# Patient Record
Sex: MALE | Race: OTHER | NOT HISPANIC OR LATINO | Employment: PART TIME | URBAN - METROPOLITAN AREA
[De-identification: names, ages, dates, MRNs, and addresses within clinical notes are randomized per-mention and may not be internally consistent; named-entity substitution may affect disease eponyms.]

---

## 2017-11-03 ENCOUNTER — ALLSCRIPTS OFFICE VISIT (OUTPATIENT)
Dept: OTHER | Facility: OTHER | Age: 62
End: 2017-11-03

## 2017-11-03 DIAGNOSIS — C61 MALIGNANT NEOPLASM OF PROSTATE (HCC): ICD-10-CM

## 2017-11-03 LAB
BILIRUB UR QL STRIP: NORMAL
CLARITY UR: NORMAL
COLOR UR: YELLOW
GLUCOSE (HISTORICAL): NORMAL
HGB UR QL STRIP.AUTO: NORMAL
KETONES UR STRIP-MCNC: NORMAL MG/DL
LEUKOCYTE ESTERASE UR QL STRIP: NORMAL
NITRITE UR QL STRIP: NORMAL
PH UR STRIP.AUTO: 6.5 [PH]
PROT UR STRIP-MCNC: NORMAL MG/DL
SP GR UR STRIP.AUTO: 1.01
UROBILINOGEN UR QL STRIP.AUTO: 0.2

## 2017-11-08 ENCOUNTER — TRANSCRIBE ORDERS (OUTPATIENT)
Dept: ADMINISTRATIVE | Facility: HOSPITAL | Age: 62
End: 2017-11-08

## 2017-11-08 ENCOUNTER — APPOINTMENT (OUTPATIENT)
Dept: LAB | Facility: HOSPITAL | Age: 62
End: 2017-11-08
Attending: UROLOGY
Payer: COMMERCIAL

## 2017-11-08 DIAGNOSIS — C61 MALIGNANT NEOPLASM OF PROSTATE (HCC): ICD-10-CM

## 2017-11-08 LAB
PSA SERPL-MCNC: 0.2 NG/ML (ref 0–4)
TESTOST SERPL-MCNC: 373.7 NG/DL (ref 241–827)

## 2017-11-08 PROCEDURE — 36415 COLL VENOUS BLD VENIPUNCTURE: CPT

## 2017-11-08 PROCEDURE — 84403 ASSAY OF TOTAL TESTOSTERONE: CPT

## 2017-11-08 PROCEDURE — 84153 ASSAY OF PSA TOTAL: CPT

## 2018-01-22 VITALS
WEIGHT: 164 LBS | SYSTOLIC BLOOD PRESSURE: 110 MMHG | BODY MASS INDEX: 25.74 KG/M2 | DIASTOLIC BLOOD PRESSURE: 84 MMHG | HEIGHT: 67 IN

## 2018-05-03 DIAGNOSIS — C61 MALIGNANT NEOPLASM OF PROSTATE (HCC): ICD-10-CM

## 2018-05-07 ENCOUNTER — TRANSCRIBE ORDERS (OUTPATIENT)
Dept: ADMINISTRATIVE | Facility: HOSPITAL | Age: 63
End: 2018-05-07

## 2018-05-07 ENCOUNTER — APPOINTMENT (OUTPATIENT)
Dept: LAB | Facility: HOSPITAL | Age: 63
End: 2018-05-07
Attending: UROLOGY
Payer: COMMERCIAL

## 2018-05-07 DIAGNOSIS — C61 MALIGNANT NEOPLASM OF PROSTATE (HCC): ICD-10-CM

## 2018-05-07 LAB
PSA SERPL-MCNC: 0.5 NG/ML (ref 0–4)
TESTOST SERPL-MCNC: 404 NG/DL (ref 95–948)

## 2018-05-07 PROCEDURE — 36415 COLL VENOUS BLD VENIPUNCTURE: CPT

## 2018-05-07 PROCEDURE — 84403 ASSAY OF TOTAL TESTOSTERONE: CPT

## 2018-05-07 PROCEDURE — 84153 ASSAY OF PSA TOTAL: CPT

## 2018-05-21 RX ORDER — SIMVASTATIN 80 MG
TABLET ORAL
COMMUNITY

## 2018-05-23 ENCOUNTER — OFFICE VISIT (OUTPATIENT)
Dept: UROLOGY | Facility: MEDICAL CENTER | Age: 63
End: 2018-05-23
Payer: COMMERCIAL

## 2018-05-23 VITALS
DIASTOLIC BLOOD PRESSURE: 84 MMHG | HEIGHT: 67 IN | WEIGHT: 163 LBS | SYSTOLIC BLOOD PRESSURE: 128 MMHG | BODY MASS INDEX: 25.58 KG/M2

## 2018-05-23 DIAGNOSIS — C61 MALIGNANT NEOPLASM OF PROSTATE (HCC): Primary | ICD-10-CM

## 2018-05-23 PROBLEM — E78.00 HYPERCHOLESTEROLEMIA: Status: ACTIVE | Noted: 2017-10-31

## 2018-05-23 LAB
SL AMB  POCT GLUCOSE, UA: NORMAL
SL AMB LEUKOCYTE ESTERASE,UA: NORMAL
SL AMB POCT BILIRUBIN,UA: NORMAL
SL AMB POCT BLOOD,UA: NORMAL
SL AMB POCT CLARITY,UA: CLEAR
SL AMB POCT COLOR,UA: YELLOW
SL AMB POCT KETONES,UA: NORMAL
SL AMB POCT NITRITE,UA: NORMAL
SL AMB POCT PH,UA: 7
SL AMB POCT SPECIFIC GRAVITY,UA: 1.01
SL AMB POCT URINE PROTEIN: NORMAL
SL AMB POCT UROBILINOGEN: 0.2

## 2018-05-23 PROCEDURE — 81003 URINALYSIS AUTO W/O SCOPE: CPT | Performed by: UROLOGY

## 2018-05-23 PROCEDURE — 99214 OFFICE O/P EST MOD 30 MIN: CPT | Performed by: UROLOGY

## 2018-05-23 NOTE — PROGRESS NOTES
IPSS Questionnaire (AUA-7): Over the past month    1)  How often have you had a sensation of not emptying your bladder completely after you finish urinating? 0 - Not at all   2)  How often have you had to urinate again less than two hours after you finished urinating? 1 - Less than 1 time in 5   3)  How often have you found you stopped and started again several times when you urinated? 0 - Not at all   4) How difficult have you found it to postpone urination? 1 - Less than 1 time in 5   5) How often have you had a weak urinary stream?  0 - Not at all   6) How often have you had to push or strain to begin urination? 0 - Not at all   7) How many times did you most typically get up to urinate from the time you went to bed until the time you got up in the morning?   1 - 1 time   Total Score:  3

## 2018-05-23 NOTE — ASSESSMENT & PLAN NOTE
PSA is 0 5 (May 7, 2018)  Testosterone level is now normal   He is voiding well and is pleased with his voiding pattern  Urinalysis is negative  We will continue to follow PSA  He will return in 6 months and repeat his PSA at that time

## 2018-05-23 NOTE — LETTER
May 23, 2018     Reymundo Reddy DO  [address] [facility]    Patient: Arthur Wells   YOB: 1955   Date of Visit: 5/23/2018       Dear Dr Chi Ruggiero: Thank you for referring Danny Roberts to me for evaluation  Below are my notes for this consultation  If you have questions, please do not hesitate to call me  I look forward to following your patient along with you  Sincerely,        Esteban Hurst MD        CC: No Recipients  Esteban Hurst MD  5/23/2018 11:21 AM  Sign at close encounter  Assessment/Plan:    No problem-specific Assessment & Plan notes found for this encounter  Diagnoses and all orders for this visit:    Malignant neoplasm of prostate ([facility] [address] )  -     POCT urine dip auto non-scope    Other orders  -     aspirin 81 MG tablet; Take by mouth  -     simvastatin (ZOCOR) 80 mg tablet; Take by mouth          Subjective:      Patient ID: Arthur Wells is a 65 y o  male      HPI    The following portions of the patient's history were reviewed and updated as appropriate: allergies, current medications, past family history, past medical history, past social history, past surgical history and problem list     Review of Systems      Objective:      /84 (BP Location: Left arm, Patient Position: Sitting)   Ht 5' 7" (1 702 m)   Wt 73 9 kg (163 lb)   BMI 25 53 kg/m²           Physical Exam

## 2018-05-23 NOTE — PROGRESS NOTES
Assessment/Plan:    Malignant neoplasm of prostate ([facility])  PSA is 0 5 (May 7, 2018)  Testosterone level is now normal   He is voiding well and is pleased with his voiding pattern  Urinalysis is negative  We will continue to follow PSA  He will return in 6 months and repeat his PSA at that time  Diagnoses and all orders for this visit:    Malignant neoplasm of prostate ([facility] [address] )  -     POCT urine dip auto non-scope  -     PSA Total, Diagnostic; Future    Other orders  -     aspirin 81 MG tablet; Take by mouth  -     simvastatin (ZOCOR) 80 mg tablet; Take by mouth          Subjective:      Patient ID: Radha Cheung is a 65 y o  male  58-year-old male who completed IMRT 9 months ago  He had 1- 6 month dose of neoadjuvant hormonal therapy  He discontinued the medication as he did not feel well on Lupron  He notes he is voiding very well  He has no new back or bone pain  There are no constitutional symptoms  His stream is good and he empties his bladder adequately  There is no gross hematuria, dysuria or symptoms of infection  He does not get up at night to urinate  He is pleased with his voiding pattern  He denies rectal bleeding  The following portions of the patient's history were reviewed and updated as appropriate: allergies, current medications, past family history, past medical history, past social history, past surgical history and problem list     Review of Systems   Constitutional: Negative for chills, diaphoresis, fatigue and fever  HENT: Negative  Eyes: Negative  Respiratory: Negative  Cardiovascular: Negative  Endocrine: Negative  Musculoskeletal: Negative  Skin: Positive for rash  Allergic/Immunologic: Negative  Neurological: Negative  Hematological: Negative  Psychiatric/Behavioral: Negative            Objective:      /84 (BP Location: Left arm, Patient Position: Sitting)   Ht 5' 7" (1 702 m)   Wt 73 9 kg (163 lb)   BMI 25 53 kg/m² Physical Exam   Constitutional: He is oriented to person, place, and time  He appears well-developed and well-nourished  HENT:   Head: Normocephalic and atraumatic  Eyes: Conjunctivae are normal    Neck: Neck supple  Cardiovascular: Normal rate  Pulmonary/Chest: Effort normal    Abdominal: Soft  He exhibits no distension and no mass  There is no tenderness  There is no rebound and no guarding  No inguinal hernia   Genitourinary: Rectum normal and penis normal  No penile tenderness  Genitourinary Comments: Tinea cruris    Prostate flat and free of induration   Musculoskeletal: Normal range of motion  Neurological: He is alert and oriented to person, place, and time  Skin: Skin is warm and dry  Psychiatric: He has a normal mood and affect  His behavior is normal  Judgment and thought content normal    Nursing note and vitals reviewed

## 2018-05-23 NOTE — PATIENT INSTRUCTIONS
Prostate Cancer, Ambulatory Care   GENERAL INFORMATION:   Prostate cancer, Ambulatory Care develops in the male sex gland that helps make semen (prostate)  It is about the size of a walnut and wraps around the urethra  The urethra is the tube that carries urine from the bladder to the end of the penis  In most cases, prostate cancer is slow growing  Common symptoms include the following:   · Trouble starting or stopping the flow of urine    · Feeling the need to urinate often, especially at night    · Pain or a burning feeling when you urinate or ejaculate semen    · Trouble having an erection    · Blood in your urine or semen    · Not being able to urinate at all    · Pain or stiffness in your lower back, hips, or upper thighs  Seek immediate care for the following symptoms:   · Chest pain when you take a deep breath or cough    · Coughing up blood    · Suddenly feeling lightheaded and short of breath    · Your leg feels warm, tender, and painful  It may look swollen and red  Treatment for prostate cancer: If you have early stage cancer, your healthcare provider may recommend that you have frequent tests and regular follow-up visits to watch for changes  You may also need any of the following:  · Hormone therapy  is medicine used to decrease testosterone (male hormone) levels  · Radiation therapy  is used to kill cancer cells with high-energy x-rays beams  You may receive radiation therapy from outside your body or from small beads or rods placed inside your prostate  · Surgery  may be needed, depending on the stage of the cancer  Part or all of your prostate may be removed  You may also need to have some lymph nodes taken out  This may help keep the cancer from spreading to other parts of your body  Manage your prostate cancer:   · Eat a variety of healthy foods  Healthy foods include fruits, vegetables, whole-grain breads, low-fat dairy products, beans, lean meats, and fish   Your healthcare provider may also recommend changes to the amounts of calcium and vitamin D you have each day  · Manage your weight  Obesity may increase your risk for problems from prostate cancer  Limit or do not have high-calorie foods or drinks  · Exercise as directed  Exercise may help you recover after treatment and may help prevent your prostate cancer from returning  Exercise can also help you manage your weight  Try to get at least 30 minutes of exercise 5 days a week, such as walking  · Do not smoke  If you smoke, it is never too late to quit  Smoking increases the risk that your prostate cancer will return after treatment  Smoking also increases your risk for other types of cancer  Ask your healthcare provider for information if you need help quitting  · Limit or do not drink alcohol  If you drink, limit alcohol to 2 drinks per day  A drink is 12 ounces of beer, 1½ ounces of liquor, or 5 ounces of wine  Follow up with your urologist or oncologist as directed:  Write down your questions so you remember to ask them during your visits  CARE AGREEMENT:   You have the right to help plan your care  Learn about your health condition and how it may be treated  Discuss treatment options with your caregivers to decide what care you want to receive  You always have the right to refuse treatment  The above information is an  only  It is not intended as medical advice for individual conditions or treatments  Talk to your doctor, nurse or pharmacist before following any medical regimen to see if it is safe and effective for you  © 2014 [address] is for End User's use only and may not be sold, redistributed or otherwise used for commercial purposes  All illustrations and images included in CareNotes® are the copyrighted property of [employer] M [employer]  or [employer]

## 2018-11-21 ENCOUNTER — APPOINTMENT (OUTPATIENT)
Dept: LAB | Facility: HOSPITAL | Age: 63
End: 2018-11-21
Attending: UROLOGY
Payer: COMMERCIAL

## 2018-11-21 DIAGNOSIS — C61 MALIGNANT NEOPLASM OF PROSTATE (HCC): ICD-10-CM

## 2018-11-21 LAB — PSA SERPL-MCNC: 0.6 NG/ML (ref 0–4)

## 2018-11-21 PROCEDURE — 84153 ASSAY OF PSA TOTAL: CPT

## 2018-11-21 PROCEDURE — 36415 COLL VENOUS BLD VENIPUNCTURE: CPT

## 2018-12-14 ENCOUNTER — OFFICE VISIT (OUTPATIENT)
Dept: UROLOGY | Facility: MEDICAL CENTER | Age: 63
End: 2018-12-14
Payer: COMMERCIAL

## 2018-12-14 VITALS
SYSTOLIC BLOOD PRESSURE: 138 MMHG | HEIGHT: 67 IN | WEIGHT: 156 LBS | DIASTOLIC BLOOD PRESSURE: 78 MMHG | BODY MASS INDEX: 24.48 KG/M2 | HEART RATE: 66 BPM

## 2018-12-14 DIAGNOSIS — C61 MALIGNANT NEOPLASM OF PROSTATE (HCC): Primary | ICD-10-CM

## 2018-12-14 LAB
SL AMB  POCT GLUCOSE, UA: NORMAL
SL AMB LEUKOCYTE ESTERASE,UA: NORMAL
SL AMB POCT BILIRUBIN,UA: NORMAL
SL AMB POCT BLOOD,UA: NORMAL
SL AMB POCT CLARITY,UA: CLEAR
SL AMB POCT COLOR,UA: YELLOW
SL AMB POCT KETONES,UA: NORMAL
SL AMB POCT NITRITE,UA: NORMAL
SL AMB POCT PH,UA: 7.5
SL AMB POCT SPECIFIC GRAVITY,UA: 1.01
SL AMB POCT URINE PROTEIN: NORMAL
SL AMB POCT UROBILINOGEN: 1

## 2018-12-14 PROCEDURE — 81003 URINALYSIS AUTO W/O SCOPE: CPT | Performed by: UROLOGY

## 2018-12-14 PROCEDURE — 99214 OFFICE O/P EST MOD 30 MIN: CPT | Performed by: UROLOGY

## 2018-12-14 RX ORDER — AZELASTINE 1 MG/ML
SPRAY, METERED NASAL
COMMUNITY
Start: 2018-12-11 | End: 2021-01-28 | Stop reason: CLARIF

## 2018-12-14 NOTE — PROGRESS NOTES
Assessment/Plan:    Malignant neoplasm of prostate [facility])  He completed radiation therapy August 2017  He received 1 dose of Depot Lupron prior to radiation therapy  AUA symptom score is 3  He is pleased with his voiding pattern  Urinalysis is negative  PSA was 0 6 on November 23, 2018  We will plan to recheck the PSA in 6 months  He will return in 6 months for follow-up  Diagnoses and all orders for this visit:    Malignant neoplasm of prostate ([facility] [address] )  -     POCT urine dip auto non-scope  -     PSA Total, Diagnostic; Future    Other orders  -     azelastine (ASTELIN) 0 1 % nasal spray;           Subjective:      Patient ID: Hanna Amor is a 59 y o  male  Chief complaint:  Prostate cancer    HPI:  72-year-old male followed for prostate cancer  He completed radiation therapy in August 2017  He received 1 6 months dose of neoadjuvant Lupron prior to beginning the radiation therapy  He notes he is voiding well  His stream is good and he empties his bladder adequately  There is no gross hematuria, dysuria or symptoms of infection  He has a good appetite  He remains active  There is no new back or bone pain  He is feeling well  The following portions of the patient's history were reviewed and updated as appropriate: allergies, current medications, past family history, past medical history, past social history, past surgical history and problem list     Review of Systems   Constitutional: Negative for chills, diaphoresis, fatigue and fever  HENT: Negative  Eyes: Negative  Respiratory: Negative  Cardiovascular: Negative  Gastrointestinal: Positive for anal bleeding  GI appt next week   Endocrine: Negative  Genitourinary:        See HPI   Musculoskeletal: Negative  Skin: Negative  Allergic/Immunologic: Negative  Neurological: Negative  Hematological: Negative  Psychiatric/Behavioral: Negative            Objective:      /78 (BP Location: Left arm, Patient Position: Sitting, Cuff Size: Adult)   Pulse 66   Ht 5' 7" (1 702 m)   Wt 70 8 kg (156 lb)   BMI 24 43 kg/m²          Physical Exam   Constitutional: He is oriented to person, place, and time  He appears well-developed and well-nourished  HENT:   Head: Normocephalic and atraumatic  Eyes: Conjunctivae are normal    Neck: Neck supple  Cardiovascular: Normal rate  Pulmonary/Chest: Effort normal    Abdominal: Soft  He exhibits no distension and no mass  There is no tenderness  There is no rebound and no guarding  No inguinal hernia   Genitourinary: Rectum normal and penis normal  No penile tenderness  Genitourinary Comments: Prostate flat and free of induration   Musculoskeletal: Normal range of motion  Neurological: He is alert and oriented to person, place, and time  Skin: Skin is warm and dry  Psychiatric: He has a normal mood and affect  His behavior is normal  Judgment and thought content normal    Vitals reviewed

## 2018-12-14 NOTE — PATIENT INSTRUCTIONS
Prostate Cancer, Ambulatory Care   GENERAL INFORMATION:   Prostate cancer, Ambulatory Care develops in the male sex gland that helps make semen (prostate)  It is about the size of a walnut and wraps around the urethra  The urethra is the tube that carries urine from the bladder to the end of the penis  In most cases, prostate cancer is slow growing  Common symptoms include the following:   · Trouble starting or stopping the flow of urine    · Feeling the need to urinate often, especially at night    · Pain or a burning feeling when you urinate or ejaculate semen    · Trouble having an erection    · Blood in your urine or semen    · Not being able to urinate at all    · Pain or stiffness in your lower back, hips, or upper thighs  Seek immediate care for the following symptoms:   · Chest pain when you take a deep breath or cough    · Coughing up blood    · Suddenly feeling lightheaded and short of breath    · Your leg feels warm, tender, and painful  It may look swollen and red  Treatment for prostate cancer: If you have early stage cancer, your healthcare provider may recommend that you have frequent tests and regular follow-up visits to watch for changes  You may also need any of the following:  · Hormone therapy  is medicine used to decrease testosterone (male hormone) levels  · Radiation therapy  is used to kill cancer cells with high-energy x-rays beams  You may receive radiation therapy from outside your body or from small beads or rods placed inside your prostate  · Surgery  may be needed, depending on the stage of the cancer  Part or all of your prostate may be removed  You may also need to have some lymph nodes taken out  This may help keep the cancer from spreading to other parts of your body  Manage your prostate cancer:   · Eat a variety of healthy foods  Healthy foods include fruits, vegetables, whole-grain breads, low-fat dairy products, beans, lean meats, and fish   Your healthcare provider may also recommend changes to the amounts of calcium and vitamin D you have each day  · Manage your weight  Obesity may increase your risk for problems from prostate cancer  Limit or do not have high-calorie foods or drinks  · Exercise as directed  Exercise may help you recover after treatment and may help prevent your prostate cancer from returning  Exercise can also help you manage your weight  Try to get at least 30 minutes of exercise 5 days a week, such as walking  · Do not smoke  If you smoke, it is never too late to quit  Smoking increases the risk that your prostate cancer will return after treatment  Smoking also increases your risk for other types of cancer  Ask your healthcare provider for information if you need help quitting  · Limit or do not drink alcohol  If you drink, limit alcohol to 2 drinks per day  A drink is 12 ounces of beer, 1½ ounces of liquor, or 5 ounces of wine  Follow up with your urologist or oncologist as directed:  Write down your questions so you remember to ask them during your visits  CARE AGREEMENT:   You have the right to help plan your care  Learn about your health condition and how it may be treated  Discuss treatment options with your caregivers to decide what care you want to receive  You always have the right to refuse treatment  The above information is an  only  It is not intended as medical advice for individual conditions or treatments  Talk to your doctor, nurse or pharmacist before following any medical regimen to see if it is safe and effective for you  © 2014 [address] is for End User's use only and may not be sold, redistributed or otherwise used for commercial purposes  All illustrations and images included in CareNotes® are the copyrighted property of [employer] M [employer] Inc  or [employer]

## 2018-12-14 NOTE — ASSESSMENT & PLAN NOTE
He completed radiation therapy August 2017  He received 1 dose of Depot Lupron prior to radiation therapy  AUA symptom score is 3  He is pleased with his voiding pattern  Urinalysis is negative  PSA was 0 6 on November 23, 2018  We will plan to recheck the PSA in 6 months  He will return in 6 months for follow-up

## 2018-12-14 NOTE — LETTER
December 14, 2018     Farzana Louis DO  [address] [facility]    Patient: Adalberto Kumar   YOB: 1955   Date of Visit: 12/14/2018       Dear Dr Good Wyman: Thank you for referring Mario Kelley to me for evaluation  Below are my notes for this consultation  If you have questions, please do not hesitate to call me  I look forward to following your patient along with you  Sincerely,        Lloyd Dodge MD        CC: No Recipients  Lloyd Dodge MD  12/14/2018  4:03 PM  Sign at close encounter  Assessment/Plan:    Malignant neoplasm of prostate [facility])  He completed radiation therapy August 2017  He received 1 dose of Depot Lupron prior to radiation therapy  AUA symptom score is 3  He is pleased with his voiding pattern  Urinalysis is negative  PSA was 0 6 on November 23, 2018  We will plan to recheck the PSA in 6 months  He will return in 6 months for follow-up  Diagnoses and all orders for this visit:    Malignant neoplasm of prostate ([address] )  -     POCT urine dip auto non-scope  -     PSA Total, Diagnostic; Future    Other orders  -     azelastine (ASTELIN) 0 1 % nasal spray;           Subjective:      Patient ID: Adalberto Kumar is a 59 y o  male  Chief complaint:  Prostate cancer    HPI:  29-year-old male followed for prostate cancer  He completed radiation therapy in August 2017  He received 1 6 months dose of neoadjuvant Lupron prior to beginning the radiation therapy  He notes he is voiding well  His stream is good and he empties his bladder adequately  There is no gross hematuria, dysuria or symptoms of infection  He has a good appetite  He remains active  There is no new back or bone pain  He is feeling well          The following portions of the patient's history were reviewed and updated as appropriate: allergies, current medications, past family history, past medical history, past social history, past surgical history and problem list     Review of Systems   Constitutional: Negative for chills, diaphoresis, fatigue and fever  HENT: Negative  Eyes: Negative  Respiratory: Negative  Cardiovascular: Negative  Gastrointestinal: Positive for anal bleeding  GI appt next week   Endocrine: Negative  Genitourinary:        See HPI   Musculoskeletal: Negative  Skin: Negative  Allergic/Immunologic: Negative  Neurological: Negative  Hematological: Negative  Psychiatric/Behavioral: Negative  Objective:      /78 (BP Location: Left arm, Patient Position: Sitting, Cuff Size: Adult)   Pulse 66   Ht 5' 7" (1 702 m)   Wt 70 8 kg (156 lb)   BMI 24 43 kg/m²           Physical Exam   Constitutional: He is oriented to person, place, and time  He appears well-developed and well-nourished  HENT:   Head: Normocephalic and atraumatic  Eyes: Conjunctivae are normal    Neck: Neck supple  Cardiovascular: Normal rate  Pulmonary/Chest: Effort normal    Abdominal: Soft  He exhibits no distension and no mass  There is no tenderness  There is no rebound and no guarding  No inguinal hernia   Genitourinary: Rectum normal and penis normal  No penile tenderness  Genitourinary Comments: Prostate flat and free of induration   Musculoskeletal: Normal range of motion  Neurological: He is alert and oriented to person, place, and time  Skin: Skin is warm and dry  Psychiatric: He has a normal mood and affect  His behavior is normal  Judgment and thought content normal    Vitals reviewed

## 2019-05-29 ENCOUNTER — APPOINTMENT (OUTPATIENT)
Dept: LAB | Facility: HOSPITAL | Age: 64
End: 2019-05-29
Attending: UROLOGY
Payer: COMMERCIAL

## 2019-05-29 DIAGNOSIS — C61 MALIGNANT NEOPLASM OF PROSTATE (HCC): ICD-10-CM

## 2019-05-29 LAB — PSA SERPL-MCNC: 0.7 NG/ML (ref 0–4)

## 2019-05-29 PROCEDURE — 36415 COLL VENOUS BLD VENIPUNCTURE: CPT

## 2019-05-29 PROCEDURE — 84153 ASSAY OF PSA TOTAL: CPT

## 2019-06-14 ENCOUNTER — OFFICE VISIT (OUTPATIENT)
Dept: UROLOGY | Facility: MEDICAL CENTER | Age: 64
End: 2019-06-14
Payer: COMMERCIAL

## 2019-06-14 VITALS
SYSTOLIC BLOOD PRESSURE: 122 MMHG | HEIGHT: 67 IN | DIASTOLIC BLOOD PRESSURE: 78 MMHG | WEIGHT: 164 LBS | BODY MASS INDEX: 25.74 KG/M2 | HEART RATE: 60 BPM

## 2019-06-14 DIAGNOSIS — C61 MALIGNANT NEOPLASM OF PROSTATE (HCC): Primary | ICD-10-CM

## 2019-06-14 LAB
SL AMB  POCT GLUCOSE, UA: ABNORMAL
SL AMB LEUKOCYTE ESTERASE,UA: ABNORMAL
SL AMB POCT BILIRUBIN,UA: ABNORMAL
SL AMB POCT BLOOD,UA: ABNORMAL
SL AMB POCT CLARITY,UA: CLEAR
SL AMB POCT COLOR,UA: YELLOW
SL AMB POCT KETONES,UA: ABNORMAL
SL AMB POCT NITRITE,UA: ABNORMAL
SL AMB POCT PH,UA: 7.5
SL AMB POCT SPECIFIC GRAVITY,UA: 1.02
SL AMB POCT URINE PROTEIN: ABNORMAL
SL AMB POCT UROBILINOGEN: 0.2

## 2019-06-14 PROCEDURE — 81003 URINALYSIS AUTO W/O SCOPE: CPT | Performed by: UROLOGY

## 2019-06-14 PROCEDURE — 99214 OFFICE O/P EST MOD 30 MIN: CPT | Performed by: UROLOGY

## 2019-09-16 ENCOUNTER — HOSPITAL ENCOUNTER (EMERGENCY)
Facility: HOSPITAL | Age: 64
Discharge: HOME | End: 2019-09-16
Attending: EMERGENCY MEDICINE
Payer: COMMERCIAL

## 2019-09-16 ENCOUNTER — APPOINTMENT (EMERGENCY)
Dept: RADIOLOGY | Facility: HOSPITAL | Age: 64
End: 2019-09-16
Payer: COMMERCIAL

## 2019-09-16 VITALS
RESPIRATION RATE: 16 BRPM | SYSTOLIC BLOOD PRESSURE: 166 MMHG | TEMPERATURE: 97.7 F | BODY MASS INDEX: 26.3 KG/M2 | DIASTOLIC BLOOD PRESSURE: 81 MMHG | HEART RATE: 70 BPM | WEIGHT: 167.55 LBS | HEIGHT: 67 IN | OXYGEN SATURATION: 99 %

## 2019-09-16 DIAGNOSIS — S61.451A DOG BITE OF RIGHT HAND, INITIAL ENCOUNTER: Primary | ICD-10-CM
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
## 2019-09-16 PROCEDURE — 90715 TDAP VACCINE 7 YRS/> IM: CPT | Performed by: EMERGENCY MEDICINE

## 2019-09-16 PROCEDURE — 99282 EMERGENCY DEPT VISIT SF MDM: CPT | Performed by: EMERGENCY MEDICINE

## 2019-09-16 PROCEDURE — 90471 IMMUNIZATION ADMIN: CPT

## 2019-09-16 PROCEDURE — 99283 EMERGENCY DEPT VISIT LOW MDM: CPT

## 2019-09-16 PROCEDURE — 73130 X-RAY EXAM OF HAND: CPT

## 2019-09-16 RX ORDER — AMOXICILLIN AND CLAVULANATE POTASSIUM 875; 125 MG/1; MG/1
1 TABLET, FILM COATED ORAL EVERY 12 HOURS
Qty: 19 TABLET | Refills: 0 | Status: SHIPPED | OUTPATIENT
Start: 2019-09-16 | End: 2019-09-26

## 2019-09-16 RX ORDER — AMOXICILLIN AND CLAVULANATE POTASSIUM 875; 125 MG/1; MG/1
1 TABLET, FILM COATED ORAL ONCE
Status: COMPLETED | OUTPATIENT
Start: 2019-09-16 | End: 2019-09-16

## 2019-09-16 RX ADMIN — AMOXICILLIN AND CLAVULANATE POTASSIUM 1 TABLET: 875; 125 TABLET, FILM COATED ORAL at 22:58

## 2019-09-16 RX ADMIN — TETANUS TOXOID, REDUCED DIPHTHERIA TOXOID AND ACELLULAR PERTUSSIS VACCINE, ADSORBED 0.5 ML: 5; 2.5; 8; 8; 2.5 SUSPENSION INTRAMUSCULAR at 22:59

## 2019-09-17 NOTE — ED PROVIDER NOTES
History  Chief Complaint   Patient presents with    Dog Bite     Per Pt " my dog bit me on my R hand "      HPI    Prior to Admission Medications   Prescriptions Last Dose Informant Patient Reported? Taking?   aspirin 81 MG tablet   Yes No   Sig: Take by mouth   azelastine (ASTELIN) 0 1 % nasal spray   Yes No   simvastatin (ZOCOR) 80 mg tablet   Yes No   Sig: Take by mouth      Facility-Administered Medications: None       Past Medical History:   Diagnosis Date    Elevated prostate specific antigen (PSA)     Enlarged prostate with lower urinary tract symptoms (LUTS)     Hypercholesterolemia     Malignant neoplasm of prostate (HCC)     Poor urinary stream        Past Surgical History:   Procedure Laterality Date    FINGER SURGERY      INGUINAL HERNIA REPAIR      INSERTION PROSTATE RADIATION SEED      GOLD SEED MARKERS WERE INSERTED    PROSTATE SURGERY      TONSILLECTOMY         Family History   Problem Relation Age of Onset    Prostate cancer Father     Heart attack Brother     Other Brother         ELEVATED PSA    Prostate cancer Brother     Prostate cancer Maternal Grandfather     Heart attack Maternal Grandfather     Prostate cancer Family      I have reviewed and agree with the history as documented  Social History     Tobacco Use    Smoking status: Never Smoker    Smokeless tobacco: Never Used   Substance Use Topics    Alcohol use: Yes     Comment: SOCIALLY/ CAFFEINE USE    Drug use: No        Review of Systems    Physical Exam  Physical Exam   Constitutional: He is oriented to person, place, and time  He appears well-developed and well-nourished  No distress  HENT:   Head: Normocephalic and atraumatic  Eyes: Pupils are equal, round, and reactive to light  Conjunctivae are normal    Neck: Normal range of motion  No tracheal deviation present  Cardiovascular: Normal rate, regular rhythm and intact distal pulses  Pulses:       Radial pulses are 2+ on the right side  Pulmonary/Chest: Effort normal  No respiratory distress  Musculoskeletal:        Right hand: He exhibits tenderness (Thenar eminence, and extending up the dorsal side of the hand towards the second MCP), laceration and swelling (Over thenar eminence)  He exhibits normal range of motion, normal capillary refill and no deformity  Hands:  Full movement of all fingers and the wrist, though mild pain with movement of the thumb   Neurological: He is alert and oriented to person, place, and time  GCS eye subscore is 4  GCS verbal subscore is 5  GCS motor subscore is 6  Skin: Skin is warm and dry  Psychiatric: He has a normal mood and affect  His behavior is normal    Nursing note and vitals reviewed  Vital Signs  ED Triage Vitals [09/16/19 2220]   Temperature Pulse Respirations Blood Pressure SpO2   97 7 °F (36 5 °C) 67 20 (!) 181/86 100 %      Temp Source Heart Rate Source Patient Position - Orthostatic VS BP Location FiO2 (%)   Oral Monitor Sitting Left arm --      Pain Score       --           Vitals:    09/16/19 2220 09/16/19 2301   BP: (!) 181/86 166/81   Pulse: 67 70   Patient Position - Orthostatic VS: Sitting          Visual Acuity      ED Medications  Medications   tetanus-diphtheria-acellular pertussis (BOOSTRIX) IM injection 0 5 mL (0 5 mL Intramuscular Given 9/16/19 2259)   amoxicillin-clavulanate (AUGMENTIN) 875-125 mg per tablet 1 tablet (1 tablet Oral Given 9/16/19 2258)       Diagnostic Studies  Results Reviewed     None                 XR hand 3+ views RIGHT    (Results Pending)              Procedures  Laceration repair  Date/Time: 9/16/2019 10:52 PM  Performed by: Juvenal Jameson MD  Authorized by: Juvenal Jameson MD   Consent: Verbal consent obtained    Risks and benefits: risks, benefits and alternatives were discussed  Consent given by: patient  Required items: required blood products, implants, devices, and special equipment available  Patient identity confirmed: verbally with patient  Body area: upper extremity  Location details: right hand  Laceration length: 1 5 cm  Foreign bodies: no foreign bodies  Tendon involvement: none  Nerve involvement: none  Vascular damage: no    Sedation:  Patient sedated: no        Procedure Details:  Preparation: Patient was prepped and draped in the usual sterile fashion  Irrigation solution: saline  Irrigation method: syringe  Amount of cleaning: standard  Debridement: none  Degree of undermining: none  Skin closure: Steri-Strips  Number of sutures: 2  Approximation: close  Approximation difficulty: simple  Patient tolerance: Patient tolerated the procedure well with no immediate complications             ED Course                               MDM  Number of Diagnoses or Management Options  Dog bite of right hand, initial encounter: new and requires workup  Diagnosis management comments: This is a 20-year-old male who presents here today with a dog bite to his right hand  He is right-hand dominant  He states he went to go PET his dog and his dog bit him  The dog is fine with the rest of the family, but has had a tendency of biting the patient four, though states it has been several months since he has last done so  The dog is up-to-date on its rabies shots  The patient's the uncertain of his last tetanus shot  He has not frequently been seen for dog bites before, because he states they are usually superficial and mild  However, he does have a laceration to the base of his thumb and is endorsing pain and swelling  He denies any other injuries outside of the hand  He takes no blood thinning medications  He has no problems with wound healing  Review of systems:  Otherwise negative unless stated as above    He is well-appearing, in no acute distress  He has several old scabs to the dorsum of the right hand, and several acute abrasions along the radial side    He has a 1 5 centimeter linear laceration of the thenar eminence which is currently hemostatic  There is some minimal gaping of the wound edges with pressure, but none with movement at the thumb  He does have tenderness and swelling along the thenar eminence as well as the dorsum of the hand, extending up towards the second MCP  He does have pain with movement of the thumb but has full range of motion  Exam is otherwise unremarkable  Given the degree of pain and tenderness we will get an x-ray to evaluate for underlying bony injury, though it is most likely due to the swelling that he is having  The wound was repaired as above without complications  Given the relatively small wound with minimal gaping, decision was made to repair with Steri-Strips to approximate the wound edges during healing  We will update his tetanus, and start him on Augmentin given that this was the result of a dog bite  X-ray or sputum myself and shows no acute bony abnormalities  I discussed with him treatment at home, follow-up, and indications for return, and he expresses understanding with this plan  Amount and/or Complexity of Data Reviewed  Tests in the radiology section of CPT®: ordered and reviewed  Independent visualization of images, tracings, or specimens: yes        Disposition  Final diagnoses:   Dog bite of right hand, initial encounter     Time reflects when diagnosis was documented in both MDM as applicable and the Disposition within this note     Time User Action Codes Description Comment    9/16/2019 11:26 PM Theodore Guerrero Add [Q83 [id number],  [facility]  0XXA] Dog bite of right hand, initial encounter       ED Disposition     ED Disposition Condition Date/Time Comment    Discharge Good Mon Sep 16, 2019 11:26 PM Leilani Mcclain discharge to home/self care        Follow-up Information     Follow up With Specialties Details Why Contact Info    Kaitlynn Adkins DO General Practice Schedule an appointment as soon as possible for a visit  As needed, to follow up on your hand [address]  [facility] [address]  [phone]            Patient's Medications   Discharge Prescriptions    AMOXICILLIN-CLAVULANATE (AUGMENTIN) 875-125 MG PER TABLET    Take 1 tablet by mouth every 12 (twelve) hours for 10 days       Start Date: 9/16/2019 End Date: 9/26/2019       Order Dose: 1 tablet       Quantity: 19 tablet    Refills: 0     No discharge procedures on file      ED Provider  Electronically Signed by           Marty Alfaro MD  09/16/19 6809

## 2019-09-17 NOTE — DISCHARGE INSTRUCTIONS
Keep the wound clean with soap and water  Is okay to wash the area and  get it wet, but do not soak the area until the wound heals and the bandages come off  He can continue to reapply the bandages to the area if they fall before the wound heals  Apply ice to help with pain and swelling  Use your hand as you are able to tolerate, but avoid activities that cause pain  Take ibuprofen (Motrin, Advil) or acetaminophen (Tylenol) as needed for pain, as per the instructions  The area will turn slightly red and swollen as it heals, tolerating usually seen sooner if you develop signs of infection, such as significant redness, swelling drainage of pus, inability to move your fingers or wrist because of pain, or any other concerns

## 2019-09-17 NOTE — ED NOTES
Pt wound cleaned with sterile water and sterile 4x4s     [facility], RN  09/16/19 9732

## 2019-11-04 ENCOUNTER — APPOINTMENT (EMERGENCY)
Dept: RADIOLOGY | Facility: HOSPITAL | Age: 64
End: 2019-11-04
Payer: COMMERCIAL

## 2019-11-04 ENCOUNTER — HOSPITAL ENCOUNTER (EMERGENCY)
Facility: HOSPITAL | Age: 64
Discharge: HOME | End: 2019-11-04
Admitting: EMERGENCY MEDICINE
Payer: COMMERCIAL

## 2019-11-04 VITALS
TEMPERATURE: 97.7 F | SYSTOLIC BLOOD PRESSURE: 135 MMHG | RESPIRATION RATE: 17 BRPM | DIASTOLIC BLOOD PRESSURE: 77 MMHG | OXYGEN SATURATION: 98 % | HEART RATE: 80 BPM

## 2019-11-04 DIAGNOSIS — S01.119A EYEBROW LACERATION: Primary | ICD-10-CM

## 2019-11-04 LAB
BASE EXCESS BLDA CALC-SCNC: 5 MMOL/L (ref -2–3)
CA-I BLD-SCNC: 1.07 MMOL/L (ref 1.12–1.32)
GLUCOSE SERPL-MCNC: 108 MG/DL (ref 65–140)
HCO3 BLDA-SCNC: 28.8 MMOL/L (ref 24–30)
HCT VFR BLD CALC: 43 % (ref 36.5–49.3)
HGB BLDA-MCNC: 14.6 G/DL (ref 12–17)
HOLD SPECIMEN: NORMAL
HOLD SPECIMEN: NORMAL
PCO2 BLD: 30 MMOL/L (ref 21–32)
PCO2 BLD: 39.5 MM HG (ref 42–50)
PH BLD: 7.47 [PH] (ref 7.3–7.4)
PO2 BLD: 50 MM HG (ref 35–45)
POTASSIUM BLD-SCNC: 4.3 MMOL/L (ref 3.5–5.3)
SAO2 % BLD FROM PO2: 87 % (ref 60–85)
SODIUM BLD-SCNC: 140 MMOL/L (ref 136–145)
SPECIMEN SOURCE: ABNORMAL

## 2019-11-04 PROCEDURE — 84132 ASSAY OF SERUM POTASSIUM: CPT

## 2019-11-04 PROCEDURE — 82947 ASSAY GLUCOSE BLOOD QUANT: CPT

## 2019-11-04 PROCEDURE — 85014 HEMATOCRIT: CPT

## 2019-11-04 PROCEDURE — 99282 EMERGENCY DEPT VISIT SF MDM: CPT | Performed by: EMERGENCY MEDICINE

## 2019-11-04 PROCEDURE — 71045 X-RAY EXAM CHEST 1 VIEW: CPT

## 2019-11-04 PROCEDURE — 82330 ASSAY OF CALCIUM: CPT

## 2019-11-04 PROCEDURE — 99284 EMERGENCY DEPT VISIT MOD MDM: CPT

## 2019-11-04 PROCEDURE — 70450 CT HEAD/BRAIN W/O DYE: CPT

## 2019-11-04 PROCEDURE — NC001 PR NO CHARGE: Performed by: PHYSICIAN ASSISTANT

## 2019-11-04 PROCEDURE — 82803 BLOOD GASES ANY COMBINATION: CPT

## 2019-11-04 PROCEDURE — 99281 EMR DPT VST MAYX REQ PHY/QHP: CPT | Performed by: EMERGENCY MEDICINE

## 2019-11-04 PROCEDURE — 84295 ASSAY OF SERUM SODIUM: CPT

## 2019-11-04 PROCEDURE — 36415 COLL VENOUS BLD VENIPUNCTURE: CPT | Performed by: EMERGENCY MEDICINE

## 2019-11-04 PROCEDURE — 12011 RPR F/E/E/N/L/M 2.5 CM/<: CPT | Performed by: EMERGENCY MEDICINE

## 2019-11-04 RX ORDER — LIDOCAINE HYDROCHLORIDE AND EPINEPHRINE 10; 10 MG/ML; UG/ML
10 INJECTION, SOLUTION INFILTRATION; PERINEURAL ONCE
Status: COMPLETED | OUTPATIENT
Start: 2019-11-04 | End: 2019-11-04

## 2019-11-04 RX ORDER — ACETAMINOPHEN 325 MG/1
650 TABLET ORAL EVERY 6 HOURS PRN
Qty: 30 TABLET | Refills: 0 | Status: SHIPPED | OUTPATIENT
Start: 2019-11-04 | End: 2019-12-23 | Stop reason: HOSPADM

## 2019-11-04 RX ADMIN — LIDOCAINE HYDROCHLORIDE AND EPINEPHRINE 10 ML: 10; 10 INJECTION, SOLUTION INFILTRATION; PERINEURAL at 13:45

## 2019-11-04 NOTE — DISCHARGE INSTRUCTIONS
Seek medical attn if you develop worsening headaches, dizziness, visual changes, persistent nausea/vomiting, numbness/weakness/tingling of the extremities  No contact sports for 6 weeks  Avoid repeat head trauma for 6 weeks  Wash wound daily, gently with warm soapy water and pat dry  Keep clean and dry  You may use antibiotic ointment daily for the next 3 days on the wound  Have sutures removed in 5-7 days

## 2019-11-04 NOTE — ED PROVIDER NOTES
Emergency Department Airway Evaluation and Management Form    History  Obtained from: Patient      Chief complaint  Head strike, on ASA    HPI  Fall, head strike, ASA    No past medical history on file  No past surgical history on file  No family history on file  Social History   Substance Use Topics    Smoking status: Not on file    Smokeless tobacco: Not on file    Alcohol use Not on file     I have reviewed and agree with the history as documented  Review of Systems  Laceration of forehead      Physical Exam  There were no vitals taken for this visit  Physical Exam  AAOX3, GCS 15, airway patent, ls cta bilat, pulses intact        ED Medications  Medications - No data to display    Intubation  no    Notes      CritCare Time      ED Provider  Electronically Signed by       Julissa Thornton DO  11/04/19 6100

## 2019-11-04 NOTE — PROCEDURES
Laceration repair  Date/Time: 11/4/2019 2:29 PM  Performed by: Liz Jones PA-C  Authorized by: Liz Jones PA-C   Consent: Verbal consent obtained  Consent given by: patient  Patient understanding: patient states understanding of the procedure being performed  Patient identity confirmed: verbally with patient  Time out: Immediately prior to procedure a "time out" was called to verify the correct patient, procedure, equipment, support staff and site/side marked as required    Body area: head/neck  Location details: right eyebrow  Laceration length: 2 cm  Foreign bodies: no foreign bodies  Tendon involvement: none  Nerve involvement: none  Vascular damage: no  Anesthesia: local infiltration    Anesthesia:  Local Anesthetic: lidocaine 1% with epinephrine  Anesthetic total: 3 mL    Sedation:  Patient sedated: no      Wound Dehiscence:  Superficial Wound Dehiscence: simple closure      Procedure Details:  Irrigation solution: saline  Amount of cleaning: standard  Debridement: none  Degree of undermining: none  Skin closure: 5-0 nylon  Number of sutures: 4  Technique: simple  Approximation: close  Approximation difficulty: simple  Patient tolerance: Patient tolerated the procedure well with no immediate complications

## 2019-11-04 NOTE — H&P
H&P Exam - Trauma   Ash Jaime 62 y o  male MRN: [id number]  Unit/Bed#: TR 02 Encounter: [id number]    Assessment/Plan   Trauma Alert: Level B  Model of Arrival: Self  Trauma Team: Attending Dwaine Mckee, Fellow Tyron Hansen and JUAN HOWARD  Consultants: None    Trauma Active Problems:   63 y/o male s/p mechanical fall  Platelet dysfunction due to Aspirin  R eyebrow laceration    Trauma Plan:   Right eyebrow laceration sutured without sequelae  Local wound care to eyebrow laceration with suture removal in 5-7 days by PCP (patient preference)  CT head negative for injury  F/u with PCP  Call trauma with concerns  Avoid repeat head trauma  Return to normal activity  D/w patient cognitive rest if he develops symptoms of concussion which were described  Discharge home today  Chief Complaint: Head pain    History of Present Illness   HPI:  Ash Jaime is a 62 y o  male who presents s/p trip and fall, hitting his head on the sidewalk  Mechanism:Fall    Review of Systems   Constitutional: Negative  HENT: Negative  Eyes: Negative  Respiratory: Negative  Cardiovascular: Negative  Gastrointestinal: Negative  Endocrine: Negative  Genitourinary: Negative  Musculoskeletal: Negative  Skin:        + small cut over right eyebrow   Allergic/Immunologic: Negative  Neurological: Negative  Hematological: Negative  Psychiatric/Behavioral: Negative          Historical Information     Past Medical History:   Diagnosis Date    Elevated prostate specific antigen (PSA)     Enlarged prostate with lower urinary tract symptoms (LUTS)     Hypercholesterolemia     Malignant neoplasm of prostate ([address] )     Poor urinary stream      Past Surgical History:   Procedure Laterality Date    FINGER SURGERY      INGUINAL HERNIA REPAIR      INSERTION PROSTATE RADIATION SEED      GOLD SEED MARKERS WERE INSERTED    PROSTATE SURGERY      TONSILLECTOMY       Social History   Social History     Substance and Sexual Activity   Alcohol Use Yes    Comment: SOCIALLY/ CAFFEINE USE     Social History     Substance and Sexual Activity   Drug Use No     Social History     Tobacco Use   Smoking Status Never Smoker   Smokeless Tobacco Never Used     Immunization History   Administered Date(s) Administered    INFLUENZA 02/09/2019    Influenza Injectable, MDCK, Preservative Free, Quadrivalent, 0 5 mL 02/09/2019    Tdap 09/16/2019    Zoster 04/19/2017     Last Tetanus: 9/16/2019  Family History: Non-contributory      Meds/Allergies   PTA meds:   Prior to Admission Medications   Prescriptions Last Dose Informant Patient Reported? Taking?   aspirin 81 MG tablet   Yes No   Sig: Take by mouth   azelastine (ASTELIN) 0 1 % nasal spray   Yes No   simvastatin (ZOCOR) 80 mg tablet   Yes No   Sig: Take by mouth      Facility-Administered Medications: None       No Known Allergies      PHYSICAL EXAM    Objective   Vitals:   First set: Temperature: 97 7 °F (36 5 °C) (11/04/19 1342)  Pulse: 92 (11/04/19 1342)  Respirations: 20 (11/04/19 1342)  Blood Pressure: 157/86 (11/04/19 1342)    Primary Survey:   (A) Airway: intact  (B) Breathing: equal bilaterally  (C) Circulation: Pulses:   normal  (D) Disabliity:  GCS Total:  15  (E) Expose:  Completed    Secondary Survey: (Click on Physical Exam tab above)  Physical Exam   Constitutional: He is oriented to person, place, and time  He appears well-developed and well-nourished  No distress  HENT:   Head: Normocephalic    + 2 cm laceration over R eyebrow, no active bleeding   Eyes: Pupils are equal, round, and reactive to light  Neck: Normal range of motion  Neck supple  No midline cervical spine tenderness  Cardiovascular: Normal rate, regular rhythm and normal heart sounds  Pulmonary/Chest: Effort normal and breath sounds normal  No stridor  No respiratory distress  He has no wheezes  He exhibits no tenderness  Abdominal: Soft  Bowel sounds are normal  He exhibits no distension  There is no tenderness  There is no rebound and no guarding  Musculoskeletal: Normal range of motion  He exhibits no edema or deformity  Neurological: He is alert and oriented to person, place, and time    + GCS 15, non-focal exam   Skin: Skin is warm and dry  Capillary refill takes less than 2 seconds  Psychiatric: He has a normal mood and affect  His behavior is normal  Thought content normal        Invasive Devices     Peripheral Intravenous Line            Peripheral IV 11/04/19 Right Wrist less than 1 day                Lab Results: Results: I have personally reviewed pertinent reports   , BMP/CMP: No results found for: SODIUM, K, CL, CO2, ANIONGAP, BUN, CREATININE, GLUCOSE, CALCIUM, AST, ALT, ALKPHOS, PROT, BILITOT, EGFR and CBC: No results found for: WBC, HGB, HCT, MCV, PLT, ADJUSTEDWBC, MCH, MCHC, RDW, MPV, NRBC  Imaging/EKG Studies: Results: I have personally reviewed pertinent reports    , FAST: negative, Chest X-Ray: negative, CT Scan Head: negative  Other Studies: none    Code Status: No Order  Advance Directive and Living Will:      Power of :    POLST:

## 2019-11-27 ENCOUNTER — APPOINTMENT (OUTPATIENT)
Dept: LAB | Facility: HOSPITAL | Age: 64
End: 2019-11-27
Attending: UROLOGY
Payer: COMMERCIAL

## 2019-11-27 DIAGNOSIS — C61 MALIGNANT NEOPLASM OF PROSTATE (HCC): ICD-10-CM

## 2019-11-27 LAB — PSA SERPL-MCNC: 0.7 NG/ML (ref 0–4)

## 2019-11-27 PROCEDURE — 36415 COLL VENOUS BLD VENIPUNCTURE: CPT

## 2019-11-27 PROCEDURE — 84153 ASSAY OF PSA TOTAL: CPT

## 2019-12-24 ENCOUNTER — OFFICE VISIT (OUTPATIENT)
Dept: UROLOGY | Facility: MEDICAL CENTER | Age: 64
End: 2019-12-24
Payer: COMMERCIAL

## 2019-12-24 VITALS
HEIGHT: 67 IN | BODY MASS INDEX: 25.27 KG/M2 | DIASTOLIC BLOOD PRESSURE: 74 MMHG | SYSTOLIC BLOOD PRESSURE: 128 MMHG | HEART RATE: 70 BPM | WEIGHT: 161 LBS

## 2019-12-24 DIAGNOSIS — C61 MALIGNANT NEOPLASM OF PROSTATE (HCC): Primary | ICD-10-CM

## 2019-12-24 LAB
SL AMB  POCT GLUCOSE, UA: NORMAL
SL AMB LEUKOCYTE ESTERASE,UA: NORMAL
SL AMB POCT BILIRUBIN,UA: NORMAL
SL AMB POCT BLOOD,UA: NORMAL
SL AMB POCT CLARITY,UA: CLEAR
SL AMB POCT COLOR,UA: YELLOW
SL AMB POCT KETONES,UA: NORMAL
SL AMB POCT NITRITE,UA: NORMAL
SL AMB POCT PH,UA: 6
SL AMB POCT SPECIFIC GRAVITY,UA: 1.02
SL AMB POCT URINE PROTEIN: NORMAL
SL AMB POCT UROBILINOGEN: 0.2

## 2019-12-24 PROCEDURE — 99214 OFFICE O/P EST MOD 30 MIN: CPT | Performed by: UROLOGY

## 2019-12-24 PROCEDURE — 81003 URINALYSIS AUTO W/O SCOPE: CPT | Performed by: UROLOGY

## 2019-12-24 NOTE — ASSESSMENT & PLAN NOTE
AUA symptom score is 3  He is pleased with his voiding pattern  Urinalysis is negative  PSA remains stable at 0 7  We will plan to recheck PSA in 6 months  He will return in 6 months for follow-up

## 2019-12-24 NOTE — PATIENT INSTRUCTIONS
Prostate Cancer, Ambulatory Care   GENERAL INFORMATION:   Prostate cancer, Ambulatory Care develops in the male sex gland that helps make semen (prostate)  It is about the size of a walnut and wraps around the urethra  The urethra is the tube that carries urine from the bladder to the end of the penis  In most cases, prostate cancer is slow growing  Common symptoms include the following:   · Trouble starting or stopping the flow of urine    · Feeling the need to urinate often, especially at night    · Pain or a burning feeling when you urinate or ejaculate semen    · Trouble having an erection    · Blood in your urine or semen    · Not being able to urinate at all    · Pain or stiffness in your lower back, hips, or upper thighs  Seek immediate care for the following symptoms:   · Chest pain when you take a deep breath or cough    · Coughing up blood    · Suddenly feeling lightheaded and short of breath    · Your leg feels warm, tender, and painful  It may look swollen and red  Treatment for prostate cancer: If you have early stage cancer, your healthcare provider may recommend that you have frequent tests and regular follow-up visits to watch for changes  You may also need any of the following:  · Hormone therapy  is medicine used to decrease testosterone (male hormone) levels  · Radiation therapy  is used to kill cancer cells with high-energy x-rays beams  You may receive radiation therapy from outside your body or from small beads or rods placed inside your prostate  · Surgery  may be needed, depending on the stage of the cancer  Part or all of your prostate may be removed  You may also need to have some lymph nodes taken out  This may help keep the cancer from spreading to other parts of your body  Manage your prostate cancer:   · Eat a variety of healthy foods  Healthy foods include fruits, vegetables, whole-grain breads, low-fat dairy products, beans, lean meats, and fish   Your healthcare provider may also recommend changes to the amounts of calcium and vitamin D you have each day  · Manage your weight  Obesity may increase your risk for problems from prostate cancer  Limit or do not have high-calorie foods or drinks  · Exercise as directed  Exercise may help you recover after treatment and may help prevent your prostate cancer from returning  Exercise can also help you manage your weight  Try to get at least 30 minutes of exercise 5 days a week, such as walking  · Do not smoke  If you smoke, it is never too late to quit  Smoking increases the risk that your prostate cancer will return after treatment  Smoking also increases your risk for other types of cancer  Ask your healthcare provider for information if you need help quitting  · Limit or do not drink alcohol  If you drink, limit alcohol to 2 drinks per day  A drink is 12 ounces of beer, 1½ ounces of liquor, or 5 ounces of wine  Follow up with your urologist or oncologist as directed:  Write down your questions so you remember to ask them during your visits  CARE AGREEMENT:   You have the right to help plan your care  Learn about your health condition and how it may be treated  Discuss treatment options with your caregivers to decide what care you want to receive  You always have the right to refuse treatment  The above information is an  only  It is not intended as medical advice for individual conditions or treatments  Talk to your doctor, nurse or pharmacist before following any medical regimen to see if it is safe and effective for you  © 2014 [address] is for End User's use only and may not be sold, redistributed or otherwise used for commercial purposes  All illustrations and images included in CareNotes® are the copyrighted property of [employer]  or [employer]

## 2019-12-24 NOTE — PROGRESS NOTES
Assessment/Plan:    Malignant neoplasm of prostate (HCC)  AUA symptom score is 3  He is pleased with his voiding pattern  Urinalysis is negative  PSA remains stable at 0 7  We will plan to recheck PSA in 6 months  He will return in 6 months for follow-up  Diagnoses and all orders for this visit:    Malignant neoplasm of prostate ([facility] [address] )  -     POCT urine dip auto non-scope  -     PSA Total, Diagnostic; Future          Subjective:      Patient ID: Radha Ashley is a 62 y o  male  Chief complaint:  Prostate cancer    HPI:  56-year-old male who completed radiation therapy for clinically localized prostate cancer in August 2017  He received 1 dose of neoadjuvant hormonal therapy  He reports he is voiding well  His stream is good and he empties his bladder adequately  He gets up once a night to urinate  There is no gross hematuria, dysuria or symptoms of infection  He has no incontinence  He has no new back or bone pain  There are no constitutional symptoms  He remains sexually active  He has no weight loss  He is pleased with his voiding pattern  The following portions of the patient's history were reviewed and updated as appropriate: allergies, current medications, past family history, past medical history, past social history, past surgical history and problem list     Review of Systems   Constitutional: Negative  Negative for chills, diaphoresis, fatigue and fever  HENT: Negative  Eyes: Negative  Respiratory: Negative  Cardiovascular: Negative  Endocrine: Negative  Genitourinary:        See HPI   Musculoskeletal: Negative  Skin: Negative  Allergic/Immunologic: Negative  Neurological: Negative  Hematological: Negative  Psychiatric/Behavioral: Negative  AUA SYMPTOM SCORE      Most Recent Value   AUA SYMPTOM SCORE   How often have you had a sensation of not emptying your bladder completely after you finished urinating?   1   How often have you had to urinate again less than two hours after you finished urinating? 1   How often have you found you stopped and started again several times when you urinate?  0   How often have you found it difficult to postpone urination? 0   How often have you had a weak urinary stream?  0   How often have you had to push or strain to begin urination? 0   How many times did you most typically get up to urinate from the time you went to bed at night until the time you got up in the morning? 1   Quality of Life: If you were to spend the rest of your life with your urinary condition just the way it is now, how would you feel about that?  1   AUA SYMPTOM SCORE  3        Objective:      /74 (BP Location: Left arm, Patient Position: Sitting, Cuff Size: Adult)   Pulse 70   Ht 5' 7" (1 702 m)   Wt 73 kg (161 lb)   BMI 25 22 kg/m²          Physical Exam   Constitutional: He is oriented to person, place, and time  He appears well-developed and well-nourished  HENT:   Head: Normocephalic and atraumatic  Eyes: Conjunctivae are normal    Neck: Neck supple  Cardiovascular: Normal rate  Pulmonary/Chest: Effort normal    Abdominal: Soft  He exhibits no distension and no mass  There is no tenderness  There is no rebound and no guarding  No hernia  No hernia   Genitourinary: Penis normal    Genitourinary Comments: Testes descended and normal to palpation   Neurological: He is alert and oriented to person, place, and time  Skin: Skin is warm and dry  Psychiatric: He has a normal mood and affect   His behavior is normal  Judgment and thought content normal

## 2019-12-24 NOTE — LETTER
December 24, 2019     Paulina Gonzalez DO   [address]  [address] [place] [id number]    Patient: Dahiana Milligan   YOB: 1955   Date of Visit: 12/24/2019       Dear Dr Robles Melgoza: Thank you for referring Gini Manuel to me for evaluation  Below are my notes for this consultation  If you have questions, please do not hesitate to call me  I look forward to following your patient along with you  Sincerely,        Aminata Silvestre MD        CC: No Recipients  Aminata Silvestre MD  12/24/2019 10:29 AM  Sign at close encounter  Assessment/Plan:    Malignant neoplasm of prostate ([facility] [address] )  AUA symptom score is 3  He is pleased with his voiding pattern  Urinalysis is negative  PSA remains stable at 0 7  We will plan to recheck PSA in 6 months  He will return in 6 months for follow-up  Diagnoses and all orders for this visit:    Malignant neoplasm of prostate ([address] )  -     POCT urine dip auto non-scope  -     PSA Total, Diagnostic; Future          Subjective:      Patient ID: Dahiana Milligan is a 62 y o  male  Chief complaint:  Prostate cancer    HPI:  60-year-old male who completed radiation therapy for clinically localized prostate cancer in August 2017  He received 1 dose of neoadjuvant hormonal therapy  He reports he is voiding well  His stream is good and he empties his bladder adequately  He gets up once a night to urinate  There is no gross hematuria, dysuria or symptoms of infection  He has no incontinence  He has no new back or bone pain  There are no constitutional symptoms  He remains sexually active  He has no weight loss  He is pleased with his voiding pattern  The following portions of the patient's history were reviewed and updated as appropriate: allergies, current medications, past family history, past medical history, past social history, past surgical history and problem list     Review of Systems   Constitutional: Negative    Negative for chills, diaphoresis, fatigue and fever  HENT: Negative  Eyes: Negative  Respiratory: Negative  Cardiovascular: Negative  Endocrine: Negative  Genitourinary:        See HPI   Musculoskeletal: Negative  Skin: Negative  Allergic/Immunologic: Negative  Neurological: Negative  Hematological: Negative  Psychiatric/Behavioral: Negative  AUA SYMPTOM SCORE      Most Recent Value   AUA SYMPTOM SCORE   How often have you had a sensation of not emptying your bladder completely after you finished urinating? 1   How often have you had to urinate again less than two hours after you finished urinating? 1   How often have you found you stopped and started again several times when you urinate?  0   How often have you found it difficult to postpone urination? 0   How often have you had a weak urinary stream?  0   How often have you had to push or strain to begin urination? 0   How many times did you most typically get up to urinate from the time you went to bed at night until the time you got up in the morning? 1   Quality of Life: If you were to spend the rest of your life with your urinary condition just the way it is now, how would you feel about that?  1   AUA SYMPTOM SCORE  3        Objective:      /74 (BP Location: Left arm, Patient Position: Sitting, Cuff Size: Adult)   Pulse 70   Ht 5' 7" (1 702 m)   Wt 73 kg (161 lb)   BMI 25 22 kg/m²           Physical Exam   Constitutional: He is oriented to person, place, and time  He appears well-developed and well-nourished  HENT:   Head: Normocephalic and atraumatic  Eyes: Conjunctivae are normal    Neck: Neck supple  Cardiovascular: Normal rate  Pulmonary/Chest: Effort normal    Abdominal: Soft  He exhibits no distension and no mass  There is no tenderness  There is no rebound and no guarding  No hernia     No hernia   Genitourinary: Penis normal    Genitourinary Comments: Testes descended and normal to palpation   Neurological: He is alert and oriented to person, place, and time  Skin: Skin is warm and dry  Psychiatric: He has a normal mood and affect   His behavior is normal  Judgment and thought content normal

## 2020-06-12 ENCOUNTER — APPOINTMENT (OUTPATIENT)
Dept: LAB | Facility: HOSPITAL | Age: 65
End: 2020-06-12
Attending: UROLOGY
Payer: COMMERCIAL

## 2020-06-12 DIAGNOSIS — C61 MALIGNANT NEOPLASM OF PROSTATE (HCC): ICD-10-CM

## 2020-06-12 LAB — PSA SERPL-MCNC: 0.7 NG/ML (ref 0–4)

## 2020-06-12 PROCEDURE — 36415 COLL VENOUS BLD VENIPUNCTURE: CPT

## 2020-06-12 PROCEDURE — 84153 ASSAY OF PSA TOTAL: CPT

## 2020-06-26 ENCOUNTER — OFFICE VISIT (OUTPATIENT)
Dept: UROLOGY | Facility: MEDICAL CENTER | Age: 65
End: 2020-06-26
Payer: COMMERCIAL

## 2020-06-26 VITALS
WEIGHT: 167 LBS | SYSTOLIC BLOOD PRESSURE: 126 MMHG | BODY MASS INDEX: 26.21 KG/M2 | HEIGHT: 67 IN | DIASTOLIC BLOOD PRESSURE: 78 MMHG

## 2020-06-26 DIAGNOSIS — C61 MALIGNANT NEOPLASM OF PROSTATE (HCC): Primary | ICD-10-CM

## 2020-06-26 PROCEDURE — 99214 OFFICE O/P EST MOD 30 MIN: CPT | Performed by: UROLOGY

## 2020-12-21 ENCOUNTER — LAB (OUTPATIENT)
Dept: LAB | Facility: HOSPITAL | Age: 65
End: 2020-12-21
Attending: UROLOGY
Payer: COMMERCIAL

## 2020-12-21 DIAGNOSIS — C61 MALIGNANT NEOPLASM OF PROSTATE (HCC): ICD-10-CM

## 2020-12-21 LAB — PSA SERPL-MCNC: 0.9 NG/ML (ref 0–4)

## 2020-12-21 PROCEDURE — 36415 COLL VENOUS BLD VENIPUNCTURE: CPT

## 2020-12-21 PROCEDURE — 84153 ASSAY OF PSA TOTAL: CPT

## 2021-01-29 ENCOUNTER — OFFICE VISIT (OUTPATIENT)
Dept: UROLOGY | Facility: MEDICAL CENTER | Age: 66
End: 2021-01-29
Payer: COMMERCIAL

## 2021-01-29 VITALS
DIASTOLIC BLOOD PRESSURE: 80 MMHG | SYSTOLIC BLOOD PRESSURE: 120 MMHG | WEIGHT: 167 LBS | HEART RATE: 67 BPM | BODY MASS INDEX: 26.21 KG/M2 | HEIGHT: 67 IN

## 2021-01-29 DIAGNOSIS — C61 MALIGNANT NEOPLASM OF PROSTATE (HCC): Primary | ICD-10-CM

## 2021-01-29 LAB
SL AMB  POCT GLUCOSE, UA: NORMAL
SL AMB LEUKOCYTE ESTERASE,UA: NORMAL
SL AMB POCT BILIRUBIN,UA: NORMAL
SL AMB POCT BLOOD,UA: NORMAL
SL AMB POCT CLARITY,UA: CLEAR
SL AMB POCT COLOR,UA: YELLOW
SL AMB POCT KETONES,UA: NORMAL
SL AMB POCT NITRITE,UA: NORMAL
SL AMB POCT PH,UA: 5.5
SL AMB POCT SPECIFIC GRAVITY,UA: 1.02
SL AMB POCT URINE PROTEIN: NORMAL
SL AMB POCT UROBILINOGEN: 0.2

## 2021-01-29 PROCEDURE — 81003 URINALYSIS AUTO W/O SCOPE: CPT | Performed by: NURSE PRACTITIONER

## 2021-01-29 PROCEDURE — 99213 OFFICE O/P EST LOW 20 MIN: CPT | Performed by: NURSE PRACTITIONER

## 2021-01-29 NOTE — PROGRESS NOTES
1/29/2021      Chief Complaint   Patient presents with    Prostate Cancer     Assessment and Plan    60 y o  male managed by Dr Carlos Cox    1  Prostate cancer  ·  status post IMRT 4 years ago  ·  when dose neoadjuvant hormone therapy (02/2017)  ·  PSA performed 12/21/2020 resulted 0 9  ·  repeat PSA in 3 months  · BENITA due 6/2021    History of Present Illness  Khadra Osei is a 60 y o  male here for follow up evaluation of prostate cancer  He is status post IMRT 4 years ago  He received 1 dose of neoadjuvant hormonal therapy  He reports he is doing well  He has not had any change in his voiding pattern  He voids with good stream and feels he empties his bladder adequately  He gets up once a night to urinate  There is no incontinence  He has no new back or bone pain  He denies gross hematuria, dysuria or symptoms of infection  He has no flank pain  He has no constitutional symptoms  Component       PSA, Total   Latest Ref Rng & Units       0 0 - 4 0 ng/mL   11/8/2017      8:21 AM 0 2   5/7/2018      7:25 AM 0 5   11/21/2018      8:56 AM 0 6   5/29/2019      8:39 AM 0 7   11/27/2019      9:05 AM 0 7   6/12/2020      7:36 AM 0 7   12/21/2020      10:45 AM 0 9       Review of Systems   Constitutional: Negative for chills and fever  Respiratory: Negative for cough and shortness of breath  Cardiovascular: Negative for chest pain  Gastrointestinal: Negative for abdominal distention, abdominal pain, blood in stool, nausea and vomiting  Genitourinary: Negative for difficulty urinating, dysuria, enuresis, flank pain, frequency, hematuria and urgency  Skin: Negative for rash  AUA SYMPTOM SCORE      Most Recent Value   AUA SYMPTOM SCORE   How often have you had a sensation of not emptying your bladder completely after you finished urinating? 1   How often have you had to urinate again less than two hours after you finished urinating?   1   How often have you found you stopped and started again several times when you urinate?  0   How often have you found it difficult to postpone urination? 0   How often have you had a weak urinary stream?  0   How often have you had to push or strain to begin urination? 0   How many times did you most typically get up to urinate from the time you went to bed at night until the time you got up in the morning?   1   Quality of Life: If you were to spend the rest of your life with your urinary condition just the way it is now, how would you feel about that?  0   AUA SYMPTOM SCORE  3             Past Medical History  Past Medical History:   Diagnosis Date    Elevated prostate specific antigen (PSA)     Enlarged prostate with lower urinary tract symptoms (LUTS)     Hypercholesterolemia     Malignant neoplasm of prostate ([facility] [address] )     Poor urinary stream        Past Social History  Past Surgical History:   Procedure Laterality Date    FINGER SURGERY      INGUINAL HERNIA REPAIR      INSERTION PROSTATE RADIATION SEED      GOLD SEED MARKERS WERE INSERTED    PROSTATE SURGERY      TONSILLECTOMY       Social History     Tobacco Use   Smoking Status Never Smoker   Smokeless Tobacco Never Used       Past Family History  Family History   Problem Relation Age of Onset    Prostate cancer Father     Heart attack Brother     Other Brother         ELEVATED PSA    Prostate cancer Brother     Prostate cancer Maternal Grandfather     Heart attack Maternal Grandfather     Prostate cancer Family        Past Social history  Social History     Socioeconomic History    Marital status: /Civil Union     Spouse name: Not on file    Number of children: Not on file    Years of education: Not on file    Highest education level: Not on file   Occupational History    Not on file   Social Needs    Financial resource strain: Not on file    Food insecurity     Worry: Not on file     Inability: Not on file    Transportation needs     Medical: Not on file Non-medical: Not on file   Tobacco Use    Smoking status: Never Smoker    Smokeless tobacco: Never Used   Substance and Sexual Activity    Alcohol use: Yes     Comment: SOCIALLY/ CAFFEINE USE    Drug use: No    Sexual activity: Not on file   Lifestyle    Physical activity     Days per week: Not on file     Minutes per session: Not on file    Stress: Not on file   Relationships    Social connections     Talks on phone: Not on file     Gets together: Not on file     Attends Roman [employer] service: Not on file     Active member of club or organization: Not on file     Attends meetings of clubs or organizations: Not on file     Relationship status: Not on file    Intimate partner violence     Fear of current or ex partner: Not on file     Emotionally abused: Not on file     Physically abused: Not on file     Forced sexual activity: Not on file   Other Topics Concern    Not on file   Social History Narrative    Not on file       Current Medications  Current Outpatient Medications   Medication Sig Dispense Refill    aspirin 81 MG tablet Take by mouth      simvastatin (ZOCOR) 80 mg tablet Take by mouth       No current facility-administered medications for this visit  Allergies  No Known Allergies      The following portions of the patient's history were reviewed and updated as appropriate: allergies, current medications, past medical history, past social history, past surgical history and problem list       Vitals  Vitals:    01/29/21 1114   BP: 120/80   Pulse: 67   Weight: 75 8 kg (167 lb)   Height: 5' 7" (1 702 m)           Physical Exam  Physical Exam  Vitals signs reviewed  Constitutional:       General: He is not in acute distress  Appearance: Normal appearance  He is normal weight  HENT:      Head: Normocephalic  Eyes:      Pupils: Pupils are equal, round, and reactive to light  Cardiovascular:      Rate and Rhythm: Normal rate  Pulmonary:      Effort: No respiratory distress  Breath sounds: Normal breath sounds  Skin:     General: Skin is warm and dry  Neurological:      General: No focal deficit present  Mental Status: He is alert and oriented to person, place, and time     Psychiatric:         Mood and Affect: Mood normal          Behavior: Behavior normal            Results  No results found for this or any previous visit (from the past 1 hour(s)) ]  Lab Results   Component Value Date    PSA 0 9 12/21/2020    PSA 0 7 06/12/2020    PSA 0 7 11/27/2019     Lab Results   Component Value Date    GLUCOSE 108 11/04/2019    CO2 30 11/04/2019     Lab Results   Component Value Date    HGB 14 6 11/04/2019    HCT 43 11/04/2019           Orders  Orders Placed This Encounter   Procedures    PSA Total, Diagnostic     Standing Status:   Future     Standing Expiration Date:   1/29/2022    POCT urine dip auto non-scope       TRIPP Conde

## 2021-03-26 DIAGNOSIS — Z23 ENCOUNTER FOR IMMUNIZATION: ICD-10-CM

## 2021-03-31 ENCOUNTER — IMMUNIZATIONS (OUTPATIENT)
Dept: FAMILY MEDICINE CLINIC | Facility: HOSPITAL | Age: 66
End: 2021-03-31

## 2021-03-31 DIAGNOSIS — Z23 ENCOUNTER FOR IMMUNIZATION: Primary | ICD-10-CM

## 2021-03-31 PROCEDURE — 91300 SARS-COV-2 / COVID-19 MRNA VACCINE (PFIZER-BIONTECH) 30 MCG: CPT

## 2021-03-31 PROCEDURE — 0001A SARS-COV-2 / COVID-19 MRNA VACCINE (PFIZER-BIONTECH) 30 MCG: CPT

## 2021-04-05 ENCOUNTER — APPOINTMENT (OUTPATIENT)
Dept: LAB | Facility: HOSPITAL | Age: 66
End: 2021-04-05
Payer: COMMERCIAL

## 2021-04-05 DIAGNOSIS — C61 MALIGNANT NEOPLASM OF PROSTATE (HCC): ICD-10-CM

## 2021-04-05 LAB — PSA SERPL-MCNC: 0.7 NG/ML (ref 0–4)

## 2021-04-05 PROCEDURE — 84153 ASSAY OF PSA TOTAL: CPT

## 2021-04-21 ENCOUNTER — IMMUNIZATIONS (OUTPATIENT)
Dept: FAMILY MEDICINE CLINIC | Facility: HOSPITAL | Age: 66
End: 2021-04-21

## 2021-04-21 DIAGNOSIS — Z23 ENCOUNTER FOR IMMUNIZATION: Primary | ICD-10-CM

## 2021-04-21 PROCEDURE — 91300 SARS-COV-2 / COVID-19 MRNA VACCINE (PFIZER-BIONTECH) 30 MCG: CPT

## 2021-04-21 PROCEDURE — 0002A SARS-COV-2 / COVID-19 MRNA VACCINE (PFIZER-BIONTECH) 30 MCG: CPT

## 2021-04-30 ENCOUNTER — OFFICE VISIT (OUTPATIENT)
Dept: UROLOGY | Facility: MEDICAL CENTER | Age: 66
End: 2021-04-30
Payer: COMMERCIAL

## 2021-04-30 VITALS
DIASTOLIC BLOOD PRESSURE: 80 MMHG | SYSTOLIC BLOOD PRESSURE: 120 MMHG | HEIGHT: 67 IN | HEART RATE: 70 BPM | WEIGHT: 164 LBS | BODY MASS INDEX: 25.74 KG/M2

## 2021-04-30 DIAGNOSIS — C61 MALIGNANT NEOPLASM OF PROSTATE (HCC): Primary | ICD-10-CM

## 2021-04-30 LAB
SL AMB  POCT GLUCOSE, UA: NORMAL
SL AMB LEUKOCYTE ESTERASE,UA: NORMAL
SL AMB POCT BILIRUBIN,UA: NORMAL
SL AMB POCT BLOOD,UA: NORMAL
SL AMB POCT CLARITY,UA: CLEAR
SL AMB POCT COLOR,UA: YELLOW
SL AMB POCT KETONES,UA: NORMAL
SL AMB POCT NITRITE,UA: NORMAL
SL AMB POCT PH,UA: 6.5
SL AMB POCT SPECIFIC GRAVITY,UA: 1.02
SL AMB POCT URINE PROTEIN: NORMAL
SL AMB POCT UROBILINOGEN: 0.2

## 2021-04-30 PROCEDURE — 99213 OFFICE O/P EST LOW 20 MIN: CPT | Performed by: NURSE PRACTITIONER

## 2021-04-30 PROCEDURE — 81003 URINALYSIS AUTO W/O SCOPE: CPT | Performed by: NURSE PRACTITIONER

## 2021-04-30 RX ORDER — PREDNISOLONE ACETATE 10 MG/ML
SUSPENSION/ DROPS OPHTHALMIC
COMMUNITY
Start: 2021-04-23

## 2021-04-30 NOTE — PROGRESS NOTES
4/30/2021    Assessment and Plan    60 y o  male managed by Dr Flaquita Pimentel    1  Prostate cancer  ·  status post IMRT 2017  ·  1 dose neoadjuvant hormone therapy 02/2017  ·  PSA performed 04/05/2021 resulted 0 7  ·  repeat PSA and BENITA in 6 months  ·  follow up the office in 6 months    History of Present Illness  Lula Pfeiffer is a 60 y o  male here for follow up evaluation of prostate cancer  He is status post IMRT 4 years ago  Gabino Bustillos received 1 dose of neoadjuvant hormonal therapy  Gabino Bustillos reports he is doing well  Gabino Bustillos has not had any change in his voiding pattern   He voids with good stream and feels he empties his bladder adequately  Gabino Bustillos gets up once a night to urinate   There is no incontinence   He has no new back or bone pain   He denies gross hematuria, dysuria or symptoms of infection   He has no flank pain   He has no constitutional symptoms  Component       PSA, Total   Latest Ref Rng & Units       0 0 - 4 0 ng/mL   11/8/2017      8:21 AM 0 2   5/7/2018      7:25 AM 0 5   11/21/2018      8:56 AM 0 6   5/29/2019      8:39 AM 0 7   11/27/2019      9:05 AM 0 7   6/12/2020      7:36 AM 0 7   12/21/2020      10:45 AM 0 9   4/5/2021      1:50 PM 0 7       Review of Systems   Constitutional: Negative for chills and fever  Respiratory: Negative for cough and shortness of breath  Cardiovascular: Negative for chest pain  Gastrointestinal: Negative for abdominal distention, abdominal pain, blood in stool, nausea and vomiting  Genitourinary: Negative for difficulty urinating, dysuria, enuresis, flank pain, frequency, hematuria and urgency  Skin: Negative for rash  AUA SYMPTOM SCORE      Most Recent Value   AUA SYMPTOM SCORE   How often have you had a sensation of not emptying your bladder completely after you finished urinating? 0   How often have you had to urinate again less than two hours after you finished urinating?   0   How often have you found you stopped and started again several times when you urinate?  0   How often have you found it difficult to postpone urination? 0   How often have you had a weak urinary stream?  0   How often have you had to push or strain to begin urination? 0   How many times did you most typically get up to urinate from the time you went to bed at night until the time you got up in the morning?   1   Quality of Life: If you were to spend the rest of your life with your urinary condition just the way it is now, how would you feel about that?  0   AUA SYMPTOM SCORE  1             Past Medical History  Past Medical History:   Diagnosis Date    Elevated prostate specific antigen (PSA)     Enlarged prostate with lower urinary tract symptoms (LUTS)     Hypercholesterolemia     Malignant neoplasm of prostate ([address] )     Poor urinary stream        Past Social History  Past Surgical History:   Procedure Laterality Date    FINGER SURGERY      INGUINAL HERNIA REPAIR      INSERTION PROSTATE RADIATION SEED      GOLD SEED MARKERS WERE INSERTED    PROSTATE SURGERY      TONSILLECTOMY       Social History     Tobacco Use   Smoking Status Never Smoker   Smokeless Tobacco Never Used       Past Family History  Family History   Problem Relation Age of Onset    Prostate cancer Father     Heart attack Brother     Other Brother         ELEVATED PSA    Prostate cancer Brother     Prostate cancer Maternal Grandfather     Heart attack Maternal Grandfather     Prostate cancer Family        Past Social history  Social History     Socioeconomic History    Marital status: /Civil Union     Spouse name: Not on file    Number of children: Not on file    Years of education: Not on file    Highest education level: Not on file   Occupational History    Not on file   Social Needs    Financial resource strain: Not on file    Food insecurity     Worry: Not on file     Inability: Not on file    Transportation needs     Medical: Not on file     Non-medical: Not on file   Tobacco Use  Smoking status: Never Smoker    Smokeless tobacco: Never Used   Substance and Sexual Activity    Alcohol use: Yes     Comment: SOCIALLY/ CAFFEINE USE    Drug use: No    Sexual activity: Not on file   Lifestyle    Physical activity     Days per week: Not on file     Minutes per session: Not on file    Stress: Not on file   Relationships    Social connections     Talks on phone: Not on file     Gets together: Not on file     Attends Christianity service: Not on file     Active member of club or organization: Not on file     Attends meetings of clubs or organizations: Not on file     Relationship status: Not on file    Intimate partner violence     Fear of current or ex partner: Not on file     Emotionally abused: Not on file     Physically abused: Not on file     Forced sexual activity: Not on file   Other Topics Concern    Not on file   Social History Narrative    Not on file       Current Medications  Current Outpatient Medications   Medication Sig Dispense Refill    aspirin 81 MG tablet Take by mouth      simvastatin (ZOCOR) 80 mg tablet Take by mouth      prednisoLONE acetate (PRED FORTE) 1 % ophthalmic suspension        No current facility-administered medications for this visit  Allergies  No Known Allergies      The following portions of the patient's history were reviewed and updated as appropriate: allergies, current medications, past medical history, past social history, past surgical history and problem list       Vitals  Vitals:    04/30/21 1521   BP: 120/80   Pulse: 70   Weight: 74 4 kg (164 lb)   Height: 5' 7" (1 702 m)           Physical Exam  Physical Exam  Vitals signs reviewed  Constitutional:       General: He is not in acute distress  Appearance: Normal appearance  He is normal weight  HENT:      Head: Normocephalic  Pulmonary:      Effort: No respiratory distress  Breath sounds: Normal breath sounds  Skin:     General: Skin is warm and dry     Neurological: General: No focal deficit present  Mental Status: He is alert and oriented to person, place, and time     Psychiatric:         Mood and Affect: Mood normal          Behavior: Behavior normal        Results  Recent Results (from the past 1 hour(s))   POCT urine dip auto non-scope    Collection Time: 04/30/21  3:28 PM   Result Value Ref Range     COLOR,UA yellow     CLARITY,UA clear     SPECIFIC GRAVITY,UA 1 020      PH,UA 6 5     LEUKOCYTE ESTERASE,UA neg     NITRITE,UA neg     GLUCOSE, UA neg     KETONES,UA neg     BILIRUBIN,UA neg     BLOOD,UA neg     POCT URINE PROTEIN neg     SL AMB POCT UROBILINOGEN 0 2    ]  Lab Results   Component Value Date    PSA 0 7 04/05/2021    PSA 0 9 12/21/2020    PSA 0 7 06/12/2020     Lab Results   Component Value Date    GLUCOSE 108 11/04/2019    CO2 30 11/04/2019     Lab Results   Component Value Date    HGB 14 6 11/04/2019    HCT 43 11/04/2019     Orders  Orders Placed This Encounter   Procedures    PSA Total, Diagnostic     Standing Status:   Future     Standing Expiration Date:   4/30/2022    POCT urine dip auto non-scope       TRIPP [place]

## 2021-10-18 ENCOUNTER — APPOINTMENT (OUTPATIENT)
Dept: LAB | Facility: HOSPITAL | Age: 66
End: 2021-10-18
Payer: COMMERCIAL

## 2021-10-18 DIAGNOSIS — C61 MALIGNANT NEOPLASM OF PROSTATE (HCC): ICD-10-CM

## 2021-10-18 LAB — PSA SERPL-MCNC: 0.8 NG/ML (ref 0–4)

## 2021-10-18 PROCEDURE — 84153 ASSAY OF PSA TOTAL: CPT

## 2021-10-26 ENCOUNTER — IMMUNIZATIONS (OUTPATIENT)
Dept: FAMILY MEDICINE CLINIC | Facility: HOSPITAL | Age: 66
End: 2021-10-26

## 2021-10-26 DIAGNOSIS — Z23 ENCOUNTER FOR IMMUNIZATION: Primary | ICD-10-CM

## 2021-10-26 PROCEDURE — 91300 COVID-19 PFIZER VACC 0.3 ML: CPT

## 2021-10-26 PROCEDURE — 0001A COVID-19 PFIZER VACC 0.3 ML: CPT

## 2021-11-12 ENCOUNTER — OFFICE VISIT (OUTPATIENT)
Dept: UROLOGY | Facility: CLINIC | Age: 66
End: 2021-11-12
Payer: COMMERCIAL

## 2021-11-12 VITALS
BODY MASS INDEX: 25.58 KG/M2 | TEMPERATURE: 97.5 F | WEIGHT: 163 LBS | DIASTOLIC BLOOD PRESSURE: 84 MMHG | HEIGHT: 67 IN | SYSTOLIC BLOOD PRESSURE: 130 MMHG | RESPIRATION RATE: 16 BRPM | HEART RATE: 81 BPM

## 2021-11-12 DIAGNOSIS — C61 MALIGNANT NEOPLASM OF PROSTATE (HCC): Primary | ICD-10-CM

## 2021-11-12 PROCEDURE — 99213 OFFICE O/P EST LOW 20 MIN: CPT | Performed by: PHYSICIAN ASSISTANT

## 2021-11-12 PROCEDURE — 88321 CONSLTJ&REPRT SLD PREP ELSWR: CPT | Performed by: PATHOLOGY

## 2022-05-12 ENCOUNTER — APPOINTMENT (OUTPATIENT)
Dept: LAB | Facility: HOSPITAL | Age: 67
End: 2022-05-12
Payer: COMMERCIAL

## 2022-05-12 DIAGNOSIS — C61 MALIGNANT NEOPLASM OF PROSTATE (HCC): ICD-10-CM

## 2022-05-12 LAB — PSA SERPL-MCNC: 0.7 NG/ML (ref 0–4)

## 2022-05-12 PROCEDURE — 84153 ASSAY OF PSA TOTAL: CPT

## 2022-05-12 PROCEDURE — 36415 COLL VENOUS BLD VENIPUNCTURE: CPT

## 2022-05-20 ENCOUNTER — OFFICE VISIT (OUTPATIENT)
Dept: UROLOGY | Facility: CLINIC | Age: 67
End: 2022-05-20
Payer: COMMERCIAL

## 2022-05-20 VITALS
WEIGHT: 164 LBS | OXYGEN SATURATION: 99 % | HEART RATE: 83 BPM | BODY MASS INDEX: 25.74 KG/M2 | HEIGHT: 67 IN | SYSTOLIC BLOOD PRESSURE: 122 MMHG | DIASTOLIC BLOOD PRESSURE: 76 MMHG

## 2022-05-20 DIAGNOSIS — C61 MALIGNANT NEOPLASM OF PROSTATE (HCC): Primary | ICD-10-CM

## 2022-05-20 PROCEDURE — 99213 OFFICE O/P EST LOW 20 MIN: CPT | Performed by: PHYSICIAN ASSISTANT

## 2022-05-20 RX ORDER — LORATADINE 10 MG/1
TABLET ORAL DAILY
COMMUNITY
Start: 2022-03-19

## 2022-05-20 RX ORDER — FLUTICASONE PROPIONATE 50 MCG
SPRAY, SUSPENSION (ML) NASAL 2 TIMES DAILY PRN
COMMUNITY
Start: 2022-03-27

## 2022-05-20 NOTE — PROGRESS NOTES
5/20/2022      Chief Complaint   Patient presents with    Follow-up         Assessment and Plan    73 y o  male     1  Prostate cancer  - status post IMRT and 1 dose of neoadjuvant ADT February 2017  - most recent PSA 0 7, stable  - will follow-up in 6 months with PSA prior to visit for continued surveillance  - with any questions or concerns in the meantime  - All questions answered; patient understands and agrees with plan        History of Present Illness  Екатерина Cheung is a 73 y o  male patient with history of prostate cancer here for follow up  Patient status post IMRT in 2017 with 1 dose of ADT  He has remained free of disease recurrence since that time  Most recent PSA stable at 0 7, previously 0 8  Denies any new or worsening symptoms today  Denies urinary incontinence or any other urinary complaints  Overall doing well  Denies erectile dysfunction  Review of Systems   Constitutional: Negative for activity change, appetite change, chills and fever  HENT: Negative for congestion and trouble swallowing  Respiratory: Negative for cough and shortness of breath  Cardiovascular: Negative for chest pain, palpitations and leg swelling  Gastrointestinal: Negative for abdominal pain, constipation, diarrhea, nausea and vomiting  Genitourinary: Negative for difficulty urinating, dysuria, flank pain, frequency, hematuria and urgency  Musculoskeletal: Negative for back pain and gait problem  Skin: Negative for wound  Allergic/Immunologic: Negative for immunocompromised state  Neurological: Negative for dizziness and syncope  Hematological: Does not bruise/bleed easily  Psychiatric/Behavioral: Negative for confusion  All other systems reviewed and are negative            AUA SYMPTOM SCORE    Flowsheet Row Most Recent Value   AUA SYMPTOM SCORE    How often have you had a sensation of not emptying your bladder completely after you finished urinating? 0 (P)     How often have you had to urinate again less than two hours after you finished urinating? 0 (P)     How often have you found you stopped and started again several times when you urinate? 0 (P)     How often have you found it difficult to postpone urination? 0 (P)     How often have you had a weak urinary stream? 0 (P)     How often have you had to push or strain to begin urination? 0 (P)     How many times did you most typically get up to urinate from the time you went to bed at night until the time you got up in the morning? 4 (P)     Quality of Life: If you were to spend the rest of your life with your urinary condition just the way it is now, how would you feel about that? 0 (P)     AUA SYMPTOM SCORE 4 (P)            Vitals  Vitals:    05/20/22 0849   BP: 122/76   Pulse: 83   SpO2: 99%   Weight: 74 4 kg (164 lb)   Height: 5' 7" (1 702 m)       Physical Exam  Constitutional:       General: He is not in acute distress  Appearance: Normal appearance  He is not ill-appearing, toxic-appearing or diaphoretic  HENT:      Head: Normocephalic  Nose: No congestion  Eyes:      General: No scleral icterus  Right eye: No discharge  Left eye: No discharge  Conjunctiva/sclera: Conjunctivae normal       Pupils: Pupils are equal, round, and reactive to light  Pulmonary:      Effort: Pulmonary effort is normal    Musculoskeletal:      Cervical back: Normal range of motion  Skin:     General: Skin is warm and dry  Coloration: Skin is not jaundiced or pale  Findings: No bruising, erythema, lesion or rash  Neurological:      General: No focal deficit present  Mental Status: He is alert and oriented to person, place, and time  Mental status is at baseline  Gait: Gait normal    Psychiatric:         Mood and Affect: Mood normal          Behavior: Behavior normal          Thought Content:  Thought content normal          Judgment: Judgment normal            Past History  Past Medical History: Diagnosis Date    Elevated prostate specific antigen (PSA)     Enlarged prostate with lower urinary tract symptoms (LUTS)     Hypercholesterolemia     Malignant neoplasm of prostate (HCC)     Poor urinary stream      Social History     Socioeconomic History    Marital status: /Civil Union     Spouse name: None    Number of children: None    Years of education: None    Highest education level: None   Occupational History    None   Tobacco Use    Smoking status: Never Smoker    Smokeless tobacco: Never Used   Vaping Use    Vaping Use: Never used   Substance and Sexual Activity    Alcohol use: Yes     Comment: SOCIALLY/ CAFFEINE USE    Drug use: No    Sexual activity: None   Other Topics Concern    None   Social History Narrative    None     Social Determinants of Health     Financial Resource Strain: Not on file   Food Insecurity: Not on file   Transportation Needs: Not on file   Physical Activity: Not on file   Stress: Not on file   Social Connections: Not on file   Intimate Partner Violence: Not on file   Housing Stability: Not on file     Social History     Tobacco Use   Smoking Status Never Smoker   Smokeless Tobacco Never Used     Family History   Problem Relation Age of Onset    Prostate cancer Father     Prostate cancer Paternal Grandfather     Heart attack Maternal Grandfather     Heart attack Brother     Other Brother         ELEVATED PSA    Prostate cancer Brother     Prostate cancer Family        The following portions of the patient's history were reviewed and updated as appropriate: allergies, current medications, past medical history, past social history, past surgical history and problem list     Results  No results found for this or any previous visit (from the past 1 hour(s)) ]  Lab Results   Component Value Date    PSA 0 7 05/12/2022    PSA 0 8 10/18/2021    PSA 0 7 04/05/2021    PSA 0 9 12/21/2020     Lab Results   Component Value Date    GLUCOSE 108 11/04/2019 CO2 30 11/04/2019     Lab Results   Component Value Date    HGB 14 6 11/04/2019    HCT 43 11/04/2019       Carlos Manuel Card PA-C

## 2022-10-31 ENCOUNTER — APPOINTMENT (OUTPATIENT)
Dept: LAB | Facility: CLINIC | Age: 67
End: 2022-10-31

## 2022-10-31 DIAGNOSIS — C61 MALIGNANT NEOPLASM OF PROSTATE (HCC): ICD-10-CM

## 2022-10-31 LAB — PSA SERPL-MCNC: 0.8 NG/ML (ref 0–4)

## 2022-11-10 ENCOUNTER — OFFICE VISIT (OUTPATIENT)
Dept: UROLOGY | Facility: CLINIC | Age: 67
End: 2022-11-10

## 2022-11-10 VITALS
BODY MASS INDEX: 25.9 KG/M2 | DIASTOLIC BLOOD PRESSURE: 80 MMHG | HEIGHT: 67 IN | HEART RATE: 76 BPM | WEIGHT: 165 LBS | OXYGEN SATURATION: 99 % | SYSTOLIC BLOOD PRESSURE: 118 MMHG

## 2022-11-10 DIAGNOSIS — C61 MALIGNANT NEOPLASM OF PROSTATE (HCC): Primary | ICD-10-CM

## 2022-11-10 RX ORDER — BUDESONIDE, GLYCOPYRROLATE, AND FORMOTEROL FUMARATE 160; 9; 4.8 UG/1; UG/1; UG/1
AEROSOL, METERED RESPIRATORY (INHALATION)
COMMUNITY
Start: 2022-11-01

## 2022-11-10 NOTE — PROGRESS NOTES
11/10/2022      Chief Complaint   Patient presents with   • Follow-up         Assessment and Plan    78 y o  male     1  Prostate cancer  - status post IMRT and 1 dose of neoadjuvant ADT February 2017  - most recent PSA 0 8, stable  - will follow-up in 6 months with PSA prior to visit for continued surveillance  If stable at that time, then PSA every year  - with any questions or concerns in the meantime  - All questions answered; patient understands and agrees with plan      History of Present Illness  Esvin Doll is a 78 y o  male patient with history of prostate cancer here for follow up  Patient status post IMRT in 2017 with 1 dose of ADT  He has remained free of disease recurrence since that time  Most recent PSA stable at 0 8, previously 0 7  Denies any new or worsening symptoms today  Denies urinary incontinence or any other urinary complaints  Overall doing well  Denies erectile dysfunction  Does have hematospermia from time to time, self-limiting  Review of Systems   Constitutional: Negative for activity change, appetite change, chills and fever  HENT: Negative for congestion and trouble swallowing  Respiratory: Negative for cough and shortness of breath  Cardiovascular: Negative for chest pain, palpitations and leg swelling  Gastrointestinal: Negative for abdominal pain, constipation, diarrhea, nausea and vomiting  Genitourinary: Negative for difficulty urinating, dysuria, flank pain, frequency, hematuria and urgency  Musculoskeletal: Negative for back pain and gait problem  Skin: Negative for wound  Allergic/Immunologic: Negative for immunocompromised state  Neurological: Negative for dizziness and syncope  Hematological: Does not bruise/bleed easily  Psychiatric/Behavioral: Negative for confusion  All other systems reviewed and are negative            AUA SYMPTOM SCORE    Flowsheet Row Most Recent Value   AUA SYMPTOM SCORE    How often have you had a sensation of not emptying your bladder completely after you finished urinating? 0 (P)     How often have you had to urinate again less than two hours after you finished urinating? 0 (P)     How often have you found you stopped and started again several times when you urinate? 0 (P)     How often have you found it difficult to postpone urination? 0 (P)     How often have you had a weak urinary stream? 0 (P)     How often have you had to push or strain to begin urination? 0 (P)     How many times did you most typically get up to urinate from the time you went to bed at night until the time you got up in the morning? 4 (P)     Quality of Life: If you were to spend the rest of your life with your urinary condition just the way it is now, how would you feel about that? 0 (P)     AUA SYMPTOM SCORE 4 (P)            Vitals  Vitals:    11/10/22 0908   BP: 118/80   Pulse: 76   SpO2: 99%   Weight: 74 8 kg (165 lb)   Height: 5' 7" (1 702 m)       Physical Exam  Constitutional:       General: He is not in acute distress  Appearance: Normal appearance  He is not ill-appearing, toxic-appearing or diaphoretic  HENT:      Head: Normocephalic  Nose: No congestion  Eyes:      General: No scleral icterus  Right eye: No discharge  Left eye: No discharge  Conjunctiva/sclera: Conjunctivae normal       Pupils: Pupils are equal, round, and reactive to light  Pulmonary:      Effort: Pulmonary effort is normal    Musculoskeletal:      Cervical back: Normal range of motion  Skin:     General: Skin is warm and dry  Coloration: Skin is not jaundiced or pale  Findings: No bruising, erythema, lesion or rash  Neurological:      General: No focal deficit present  Mental Status: He is alert and oriented to person, place, and time  Mental status is at baseline        Gait: Gait normal    Psychiatric:         Mood and Affect: Mood normal          Behavior: Behavior normal          Thought Content: Thought content normal          Judgment: Judgment normal            Past History  Past Medical History:   Diagnosis Date   • Elevated prostate specific antigen (PSA)    • Enlarged prostate with lower urinary tract symptoms (LUTS)    • Hypercholesterolemia    • Malignant neoplasm of prostate (HCC)    • Poor urinary stream      Social History     Socioeconomic History   • Marital status: /Civil Union     Spouse name: None   • Number of children: None   • Years of education: None   • Highest education level: None   Occupational History   • None   Tobacco Use   • Smoking status: Never Smoker   • Smokeless tobacco: Never Used   Vaping Use   • Vaping Use: Never used   Substance and Sexual Activity   • Alcohol use: Yes     Comment: SOCIALLY/ CAFFEINE USE   • Drug use: No   • Sexual activity: None   Other Topics Concern   • None   Social History Narrative   • None     Social Determinants of Health     Financial Resource Strain: Not on file   Food Insecurity: Not on file   Transportation Needs: Not on file   Physical Activity: Not on file   Stress: Not on file   Social Connections: Not on file   Intimate Partner Violence: Not on file   Housing Stability: Not on file     Social History     Tobacco Use   Smoking Status Never Smoker   Smokeless Tobacco Never Used     Family History   Problem Relation Age of Onset   • Prostate cancer Father    • Prostate cancer Paternal Grandfather    • Heart attack Maternal Grandfather    • Heart attack Brother    • Other Brother         ELEVATED PSA   • Prostate cancer Brother    • Prostate cancer Family        The following portions of the patient's history were reviewed and updated as appropriate: allergies, current medications, past medical history, past social history, past surgical history and problem list     Results  No results found for this or any previous visit (from the past 1 hour(s)) ]  Lab Results   Component Value Date    PSA 0 8 10/31/2022    PSA 0 7 05/12/2022    PSA 0 8 10/18/2021    PSA 0 7 04/05/2021     Lab Results   Component Value Date    GLUCOSE 108 11/04/2019    CO2 30 11/04/2019     Lab Results   Component Value Date    HGB 14 6 11/04/2019    HCT 43 11/04/2019       Salma Green

## 2023-04-24 ENCOUNTER — APPOINTMENT (OUTPATIENT)
Dept: LAB | Facility: HOSPITAL | Age: 68
End: 2023-04-24

## 2023-04-24 DIAGNOSIS — C61 MALIGNANT NEOPLASM OF PROSTATE (HCC): ICD-10-CM

## 2023-04-24 LAB — PSA SERPL-MCNC: 0.6 NG/ML (ref 0–4)

## 2023-05-12 ENCOUNTER — OFFICE VISIT (OUTPATIENT)
Dept: UROLOGY | Facility: CLINIC | Age: 68
End: 2023-05-12

## 2023-05-12 VITALS
WEIGHT: 162 LBS | BODY MASS INDEX: 25.43 KG/M2 | SYSTOLIC BLOOD PRESSURE: 116 MMHG | HEART RATE: 80 BPM | OXYGEN SATURATION: 98 % | DIASTOLIC BLOOD PRESSURE: 70 MMHG | HEIGHT: 67 IN | RESPIRATION RATE: 18 BRPM

## 2023-05-12 DIAGNOSIS — C61 MALIGNANT NEOPLASM OF PROSTATE (HCC): Primary | ICD-10-CM

## 2023-05-12 RX ORDER — SIMVASTATIN 20 MG
TABLET ORAL
COMMUNITY
Start: 2023-04-26

## 2023-05-12 NOTE — PROGRESS NOTES
5/12/2023      Chief Complaint   Patient presents with   • Follow-up         Assessment and Plan    78 y o  male     1  Prostate cancer  - status post IMRT and 1 dose of neoadjuvant ADT February 2017  - Most recent PSA (4/24/23) 0 6  - will follow-up in 1 year with PSA prior to visit  - Call with any questions or concerns in the meantime  - All questions answered; patient understands and agrees with plan       History of Present Illness  Wilbur Bae is a 78 y o  male patient with history of prostate cancer here for follow up  Patient status post IMRT in 2017 with 1 dose of ADT  He has remained free of disease recurrence since that time  Most recent PSA stable at 0 6  Doing well overall  Component Ref Range & Units 4/24/23  7:58 AM 10/31/22 11:18 AM 5/12/22  1:51 PM 10/18/21  4:22 PM 4/5/21  1:50 PM 12/21/20 10:45 AM 6/12/20  7:36 AM   PSA, Diagnostic 0 0 - 4 0 ng/mL 0 6  0 8 CM  0 7 CM  0 8 CM  0 7 CM  0 9 CM  0 7 CM       Review of Systems   Constitutional: Negative for activity change, appetite change, chills and fever  HENT: Negative for congestion and trouble swallowing  Respiratory: Negative for cough and shortness of breath  Cardiovascular: Negative for chest pain, palpitations and leg swelling  Gastrointestinal: Negative for abdominal pain, constipation, diarrhea, nausea and vomiting  Genitourinary: Negative for difficulty urinating, dysuria, flank pain, frequency, hematuria and urgency  Musculoskeletal: Negative for back pain and gait problem  Skin: Negative for wound  Allergic/Immunologic: Negative for immunocompromised state  Neurological: Negative for dizziness and syncope  Hematological: Does not bruise/bleed easily  Psychiatric/Behavioral: Negative for confusion  All other systems reviewed and are negative            AUA SYMPTOM SCORE    Flowsheet Row Most Recent Value   AUA SYMPTOM SCORE    How often have you had a sensation of not emptying your bladder "completely after you finished urinating? 0 (P)     How often have you had to urinate again less than two hours after you finished urinating? 1 (P)     How often have you found you stopped and started again several times when you urinate? 0 (P)     How often have you found it difficult to postpone urination? 0 (P)     How often have you had a weak urinary stream? 0 (P)     How often have you had to push or strain to begin urination? 0 (P)     How many times did you most typically get up to urinate from the time you went to bed at night until the time you got up in the morning? 5 (P)     Quality of Life: If you were to spend the rest of your life with your urinary condition just the way it is now, how would you feel about that? 0 (P)     AUA SYMPTOM SCORE 6 (P)            Vitals  Vitals:    05/12/23 0802   BP: 116/70   BP Location: Left arm   Patient Position: Sitting   Cuff Size: Adult   Pulse: 80   Resp: 18   SpO2: 98%   Weight: 73 5 kg (162 lb)   Height: 5' 7\" (1 702 m)       Physical Exam  Constitutional:       General: He is not in acute distress  Appearance: Normal appearance  He is not ill-appearing, toxic-appearing or diaphoretic  HENT:      Head: Normocephalic  Nose: No congestion  Eyes:      General: No scleral icterus  Right eye: No discharge  Left eye: No discharge  Conjunctiva/sclera: Conjunctivae normal       Pupils: Pupils are equal, round, and reactive to light  Pulmonary:      Effort: Pulmonary effort is normal    Musculoskeletal:      Cervical back: Normal range of motion  Skin:     General: Skin is warm and dry  Coloration: Skin is not jaundiced or pale  Findings: No bruising, erythema, lesion or rash  Neurological:      General: No focal deficit present  Mental Status: He is alert and oriented to person, place, and time  Mental status is at baseline        Gait: Gait normal    Psychiatric:         Mood and Affect: Mood normal          Behavior: " Behavior normal          Thought Content:  Thought content normal          Judgment: Judgment normal            Past History  Past Medical History:   Diagnosis Date   • Elevated prostate specific antigen (PSA)    • Enlarged prostate with lower urinary tract symptoms (LUTS)    • Hypercholesterolemia    • Malignant neoplasm of prostate (HCC)    • Poor urinary stream      Social History     Socioeconomic History   • Marital status: /Civil Union     Spouse name: None   • Number of children: None   • Years of education: None   • Highest education level: None   Occupational History   • None   Tobacco Use   • Smoking status: Never   • Smokeless tobacco: Never   Vaping Use   • Vaping Use: Never used   Substance and Sexual Activity   • Alcohol use: Yes     Comment: only rarely will have a glass of wine   • Drug use: No   • Sexual activity: None   Other Topics Concern   • None   Social History Narrative   • None     Social Determinants of Health     Financial Resource Strain: Not on file   Food Insecurity: Not on file   Transportation Needs: Not on file   Physical Activity: Not on file   Stress: Not on file   Social Connections: Not on file   Intimate Partner Violence: Not on file   Housing Stability: Not on file     Social History     Tobacco Use   Smoking Status Never   Smokeless Tobacco Never     Family History   Problem Relation Age of Onset   • Prostate cancer Father    • Prostate cancer Paternal Grandfather    • Heart attack Maternal Grandfather    • Heart attack Brother    • Other Brother         ELEVATED PSA   • Prostate cancer Brother    • Prostate cancer Family        The following portions of the patient's history were reviewed and updated as appropriate: allergies, current medications, past medical history, past social history, past surgical history and problem list     Results  No results found for this or any previous visit (from the past 1 hour(s)) ]  Lab Results   Component Value Date    PSA 0 6 04/24/2023    PSA 0 8 10/31/2022    PSA 0 7 05/12/2022    PSA 0 8 10/18/2021     Lab Results   Component Value Date    GLUCOSE 108 11/04/2019    CO2 30 11/04/2019     Lab Results   Component Value Date    HGB 14 6 11/04/2019    HCT 43 11/04/2019       Julia Can PA-C

## 2024-05-03 ENCOUNTER — APPOINTMENT (OUTPATIENT)
Dept: LAB | Facility: HOSPITAL | Age: 69
End: 2024-05-03
Payer: COMMERCIAL

## 2024-05-03 DIAGNOSIS — C61 MALIGNANT NEOPLASM OF PROSTATE (HCC): ICD-10-CM

## 2024-05-03 LAB — PSA SERPL-MCNC: 0.71 NG/ML (ref 0–4)

## 2024-05-03 PROCEDURE — 84153 ASSAY OF PSA TOTAL: CPT

## 2024-05-03 PROCEDURE — 36415 COLL VENOUS BLD VENIPUNCTURE: CPT

## 2024-05-21 ENCOUNTER — OFFICE VISIT (OUTPATIENT)
Dept: UROLOGY | Facility: CLINIC | Age: 69
End: 2024-05-21
Payer: COMMERCIAL

## 2024-05-21 VITALS
SYSTOLIC BLOOD PRESSURE: 142 MMHG | BODY MASS INDEX: 25.74 KG/M2 | TEMPERATURE: 98.4 F | OXYGEN SATURATION: 98 % | HEIGHT: 67 IN | WEIGHT: 164 LBS | HEART RATE: 79 BPM | DIASTOLIC BLOOD PRESSURE: 86 MMHG

## 2024-05-21 DIAGNOSIS — C61 MALIGNANT NEOPLASM OF PROSTATE (HCC): Primary | ICD-10-CM

## 2024-05-21 PROCEDURE — 99213 OFFICE O/P EST LOW 20 MIN: CPT | Performed by: PHYSICIAN ASSISTANT

## 2025-05-05 ENCOUNTER — APPOINTMENT (OUTPATIENT)
Dept: LAB | Facility: HOSPITAL | Age: 70
End: 2025-05-05
Attending: PHYSICIAN ASSISTANT
Payer: COMMERCIAL

## 2025-05-05 DIAGNOSIS — C61 MALIGNANT NEOPLASM OF PROSTATE (HCC): ICD-10-CM

## 2025-05-05 LAB — PSA SERPL-MCNC: 0.82 NG/ML (ref 0–4)

## 2025-05-05 PROCEDURE — 36415 COLL VENOUS BLD VENIPUNCTURE: CPT

## 2025-05-05 PROCEDURE — 84153 ASSAY OF PSA TOTAL: CPT

## 2025-05-21 ENCOUNTER — OFFICE VISIT (OUTPATIENT)
Dept: UROLOGY | Facility: CLINIC | Age: 70
End: 2025-05-21
Payer: COMMERCIAL

## 2025-05-21 VITALS
RESPIRATION RATE: 18 BRPM | SYSTOLIC BLOOD PRESSURE: 122 MMHG | HEIGHT: 68 IN | HEART RATE: 80 BPM | BODY MASS INDEX: 25.4 KG/M2 | WEIGHT: 167.6 LBS | OXYGEN SATURATION: 98 % | DIASTOLIC BLOOD PRESSURE: 78 MMHG | TEMPERATURE: 97.6 F

## 2025-05-21 DIAGNOSIS — C61 PROSTATE CANCER (HCC): Primary | ICD-10-CM

## 2025-05-21 PROCEDURE — 99213 OFFICE O/P EST LOW 20 MIN: CPT | Performed by: PHYSICIAN ASSISTANT

## 2025-05-21 RX ORDER — MULTIVITAMIN
1 TABLET ORAL DAILY
COMMUNITY

## 2025-05-21 NOTE — PROGRESS NOTES
Name: Krisnha Whalen      : 1955      MRN: [id number]  Encounter Provider: Andria Ortega PA-C  Encounter Date: 2025   Encounter department: [facility] [place]  :  Assessment & Plan  Prostate cancer (HCC)  - status post IMRT and 1 dose of neoadjuvant ADT 2017  - PSA (25) 0.81, stable   - Follow up in 1 year with PSA prior to visit  Orders:    PSA, total and free; Future        History of Present Illness   Krishna Whalen is a 69 y.o. male who presents for follow up.      Patient status post IMRT in  with 1 dose of ADT.  He has remained free of disease recurrence since that time.  Most recent PSA stable at 0.81. Doing well overall.     AUA SYMPTOM SCORE      Flowsheet Row Most Recent Value   AUA SYMPTOM SCORE    How often have you had a sensation of not emptying your bladder completely after you finished urinating? 0 (P)     How often have you had to urinate again less than two hours after you finished urinating? 1 (P)     How often have you found you stopped and started again several times when you urinate? 0 (P)     How often have you found it difficult to postpone urination? 0 (P)     How often have you had a weak urinary stream? 0 (P)     How often have you had to push or strain to begin urination? 0 (P)     How many times did you most typically get up to urinate from the time you went to bed at night until the time you got up in the morning? 3 (P)     Quality of Life: If you were to spend the rest of your life with your urinary condition just the way it is now, how would you feel about that? 0 (P)     AUA SYMPTOM SCORE 4 (P)            Review of Systems   Constitutional:  Negative for activity change, appetite change, chills and fever.   HENT:  Negative for congestion and trouble swallowing.    Respiratory:  Negative for cough and shortness of breath.    Cardiovascular:  Negative for chest pain, palpitations and leg swelling.   Gastrointestinal:   "Negative for abdominal pain, constipation, diarrhea, nausea and vomiting.   Genitourinary:  Negative for difficulty urinating, dysuria, flank pain, frequency, hematuria and urgency.   Musculoskeletal:  Negative for back pain and gait problem.   Skin:  Negative for wound.   Allergic/Immunologic: Negative for immunocompromised state.   Neurological:  Negative for dizziness and syncope.   Hematological:  Does not bruise/bleed easily.   Psychiatric/Behavioral:  Negative for confusion.    All other systems reviewed and are negative.         Objective   /78 (BP Location: Left arm, Patient Position: Sitting, Cuff Size: Standard)   Pulse 80   Temp 97.6 °F (36.4 °C)   Resp 18   Ht 5' 7.5\" (1.715 m)   Wt 76 kg (167 lb 9.6 oz)   SpO2 98%   BMI 25.86 kg/m²     Physical Exam  Constitutional:       Appearance: Normal appearance.   HENT:      Head: Normocephalic.      Nose: Nose normal.      Mouth/Throat:      Pharynx: Oropharynx is clear.     Eyes:      Extraocular Movements: Extraocular movements intact.      Pupils: Pupils are equal, round, and reactive to light.     Pulmonary:      Effort: Pulmonary effort is normal.     Musculoskeletal:         General: Normal range of motion.      Cervical back: Normal range of motion.     Skin:     General: Skin is warm.     Neurological:      General: No focal deficit present.      Mental Status: He is alert and oriented to person, place, and time. Mental status is at baseline.     Psychiatric:         Mood and Affect: Mood normal.         Behavior: Behavior normal.         Thought Content: Thought content normal.         Judgment: Judgment normal.           Results   Lab Results   Component Value Date    PSA 0.816 05/05/2025    PSA 0.71 05/03/2024    PSA 0.6 04/24/2023     Lab Results   Component Value Date    GLUCOSE 108 11/04/2019    CALCIUM 9 03/08/2025    K 4.9 03/08/2025    CO2 30 03/08/2025     03/08/2025    BUN 18 03/08/2025    CREATININE 0.86 03/08/2025     Lab " Results   Component Value Date    HGB 14.6 11/04/2019    HCT 43 11/04/2019       Office Urine Dip  No results found for this or any previous visit (from the past hour).

## 2025-05-31 NOTE — PROGRESS NOTES
5/21/2024      Chief Complaint   Patient presents with    Follow-up         Assessment and Plan    68 y.o. male     1. Prostate cancer  - status post IMRT and 1 dose of neoadjuvant ADT February 2017  - PSA (5/3/24) 0.71, stable   - Follow up in 1 year with PSA prior to visit  - Call with any questions or concerns in the meantime  - All questions answered; patient understands and agrees with plan       History of Present Illness  Krishna Whalen is a 68 y.o. male patient with history of prostate cancer here for follow up.      Patient status post IMRT in 2017 with 1 dose of ADT.  He has remained free of disease recurrence since that time.  Most recent PSA stable at 0.71. Doing well overall.       Review of Systems   Constitutional:  Negative for activity change, appetite change, chills and fever.   HENT:  Negative for congestion and trouble swallowing.    Respiratory:  Negative for cough and shortness of breath.    Cardiovascular:  Negative for chest pain, palpitations and leg swelling.   Gastrointestinal:  Negative for abdominal pain, constipation, diarrhea, nausea and vomiting.   Genitourinary:  Negative for difficulty urinating, dysuria, flank pain, frequency, hematuria and urgency.   Musculoskeletal:  Negative for back pain and gait problem.   Skin:  Negative for wound.   Allergic/Immunologic: Negative for immunocompromised state.   Neurological:  Negative for dizziness and syncope.   Hematological:  Does not bruise/bleed easily.   Psychiatric/Behavioral:  Negative for confusion.    All other systems reviewed and are negative.          AUA SYMPTOM SCORE      Flowsheet Row Most Recent Value   AUA SYMPTOM SCORE    How often have you had a sensation of not emptying your bladder completely after you finished urinating? 0 (P)     How often have you had to urinate again less than two hours after you finished urinating? 1 (P)     How often have you found you stopped and started again several times when you urinate? 0  Patient ambulated to and from the bathroom maintaining a steady gait   "(P)     How often have you found it difficult to postpone urination? 0 (P)     How often have you had a weak urinary stream? 0 (P)     How often have you had to push or strain to begin urination? 0 (P)     How many times did you most typically get up to urinate from the time you went to bed at night until the time you got up in the morning? 4 (P)     Quality of Life: If you were to spend the rest of your life with your urinary condition just the way it is now, how would you feel about that? 0 (P)     AUA SYMPTOM SCORE 5 (P)               Vitals  Vitals:    05/21/24 1354   BP: 142/86   Pulse: 79   Temp: 98.4 °F (36.9 °C)   TempSrc: Temporal   SpO2: 98%   Weight: 74.4 kg (164 lb)   Height: 5' 7\" (1.702 m)       Physical Exam  Constitutional:       General: He is not in acute distress.     Appearance: Normal appearance. He is not ill-appearing, toxic-appearing or diaphoretic.   HENT:      Head: Normocephalic.      Nose: No congestion.   Eyes:      General: No scleral icterus.        Right eye: No discharge.         Left eye: No discharge.      Conjunctiva/sclera: Conjunctivae normal.      Pupils: Pupils are equal, round, and reactive to light.   Pulmonary:      Effort: Pulmonary effort is normal.   Musculoskeletal:      Cervical back: Normal range of motion.   Skin:     General: Skin is warm and dry.      Coloration: Skin is not jaundiced or pale.      Findings: No bruising, erythema, lesion or rash.   Neurological:      General: No focal deficit present.      Mental Status: He is alert and oriented to person, place, and time. Mental status is at baseline.      Gait: Gait normal.   Psychiatric:         Mood and Affect: Mood normal.         Behavior: Behavior normal.         Thought Content: Thought content normal.         Judgment: Judgment normal.           Past History  Past Medical History:   Diagnosis Date    Elevated prostate specific antigen (PSA)     Enlarged prostate with lower urinary tract symptoms (LUTS)     " Hypercholesterolemia     Malignant neoplasm of prostate (HCC)     Poor urinary stream      Social History     Socioeconomic History    Marital status: /Civil Union     Spouse name: None    Number of children: None    Years of education: None    Highest education level: None   Occupational History    None   Tobacco Use    Smoking status: Never     Passive exposure: Past    Smokeless tobacco: Never   Vaping Use    Vaping status: Never Used   Substance and Sexual Activity    Alcohol use: Yes     Comment: only rarely will have a glass of wine    Drug use: No    Sexual activity: Yes     Partners: Female   Other Topics Concern    None   Social History Narrative    None     Social Determinants of Health     Financial Resource Strain: Not on file   Food Insecurity: Not on file   Transportation Needs: Not on file   Physical Activity: Not on file   Stress: Not on file   Social Connections: Not on file   Intimate Partner Violence: Not on file   Housing Stability: Not on file     Social History     Tobacco Use   Smoking Status Never    Passive exposure: Past   Smokeless Tobacco Never     Family History   Problem Relation Age of Onset    Prostate cancer Father     Prostate cancer Paternal Grandfather     Heart attack Maternal Grandfather     Heart attack Brother     Other Brother         ELEVATED PSA    Prostate cancer Brother     Prostate cancer Family        The following portions of the patient's history were reviewed and updated as appropriate: allergies, current medications, past medical history, past social history, past surgical history and problem list.    Results  No results found for this or any previous visit (from the past 1 hour(s)).]  Lab Results   Component Value Date    PSA 0.71 05/03/2024    PSA 0.6 04/24/2023    PSA 0.8 10/31/2022    PSA 0.7 05/12/2022     Lab Results   Component Value Date    GLUCOSE 108 11/04/2019    CALCIUM 9.2 11/22/2023    K 4.4 11/22/2023    CO2 30 11/22/2023     11/22/2023     BUN 16 11/22/2023    CREATININE 0.98 11/22/2023     Lab Results   Component Value Date    HGB 14.6 11/04/2019    HCT 43 11/04/2019       Andria Ortega PA-C

## 2025-06-24 ENCOUNTER — APPOINTMENT (OUTPATIENT)
Dept: LAB | Facility: HOSPITAL | Age: 70
End: 2025-06-24
Payer: COMMERCIAL

## 2025-06-24 DIAGNOSIS — E55.9 AVITAMINOSIS D: ICD-10-CM

## 2025-06-24 DIAGNOSIS — R79.9 ABNORMAL BLOOD CHEMISTRY: ICD-10-CM

## 2025-06-24 DIAGNOSIS — R53.81 DEBILITY: ICD-10-CM

## 2025-06-24 DIAGNOSIS — M25.50 PAIN IN JOINT, MULTIPLE SITES: ICD-10-CM

## 2025-06-24 LAB — 25(OH)D3 SERPL-MCNC: 20.9 NG/ML (ref 30–100)

## 2025-06-24 PROCEDURE — 36415 COLL VENOUS BLD VENIPUNCTURE: CPT

## 2025-06-24 PROCEDURE — 82306 VITAMIN D 25 HYDROXY: CPT
